# Patient Record
Sex: MALE | Race: WHITE | Employment: FULL TIME | URBAN - METROPOLITAN AREA
[De-identification: names, ages, dates, MRNs, and addresses within clinical notes are randomized per-mention and may not be internally consistent; named-entity substitution may affect disease eponyms.]

---

## 2017-06-29 DIAGNOSIS — F41.9 ANXIETY: ICD-10-CM

## 2017-06-29 RX ORDER — FLUOXETINE HYDROCHLORIDE 40 MG/1
40 CAPSULE ORAL DAILY
Qty: 30 CAPSULE | Refills: 0 | Status: SHIPPED | OUTPATIENT
Start: 2017-06-29 | End: 2017-07-17 | Stop reason: SDUPTHER

## 2017-07-17 ENCOUNTER — OFFICE VISIT (OUTPATIENT)
Dept: FAMILY MEDICINE CLINIC | Age: 37
End: 2017-07-17

## 2017-07-17 VITALS
BODY MASS INDEX: 34.71 KG/M2 | WEIGHT: 229 LBS | HEART RATE: 73 BPM | OXYGEN SATURATION: 99 % | HEIGHT: 68 IN | DIASTOLIC BLOOD PRESSURE: 64 MMHG | SYSTOLIC BLOOD PRESSURE: 124 MMHG

## 2017-07-17 DIAGNOSIS — F41.9 ANXIETY: ICD-10-CM

## 2017-07-17 PROCEDURE — 99213 OFFICE O/P EST LOW 20 MIN: CPT | Performed by: FAMILY MEDICINE

## 2017-07-17 RX ORDER — FLUOXETINE HYDROCHLORIDE 40 MG/1
40 CAPSULE ORAL DAILY
Qty: 30 CAPSULE | Refills: 11 | Status: SHIPPED | OUTPATIENT
Start: 2017-07-17 | End: 2018-07-26 | Stop reason: SDUPTHER

## 2018-07-26 DIAGNOSIS — F41.9 ANXIETY: ICD-10-CM

## 2018-07-26 RX ORDER — FLUOXETINE HYDROCHLORIDE 40 MG/1
CAPSULE ORAL
Qty: 30 CAPSULE | Refills: 10 | Status: SHIPPED | OUTPATIENT
Start: 2018-07-26 | End: 2019-08-13 | Stop reason: SDUPTHER

## 2018-08-31 DIAGNOSIS — F41.9 ANXIETY: ICD-10-CM

## 2018-08-31 RX ORDER — FLUOXETINE HYDROCHLORIDE 40 MG/1
CAPSULE ORAL
Qty: 30 CAPSULE | Refills: 10 | OUTPATIENT
Start: 2018-08-31

## 2019-01-08 ENCOUNTER — OFFICE VISIT (OUTPATIENT)
Dept: FAMILY MEDICINE CLINIC | Age: 39
End: 2019-01-08
Payer: COMMERCIAL

## 2019-01-08 VITALS
BODY MASS INDEX: 33.65 KG/M2 | WEIGHT: 222 LBS | OXYGEN SATURATION: 98 % | SYSTOLIC BLOOD PRESSURE: 124 MMHG | HEIGHT: 68 IN | DIASTOLIC BLOOD PRESSURE: 72 MMHG | HEART RATE: 69 BPM

## 2019-01-08 DIAGNOSIS — R51.9 CHRONIC NONINTRACTABLE HEADACHE, UNSPECIFIED HEADACHE TYPE: ICD-10-CM

## 2019-01-08 DIAGNOSIS — G89.29 CHRONIC NONINTRACTABLE HEADACHE, UNSPECIFIED HEADACHE TYPE: ICD-10-CM

## 2019-01-08 DIAGNOSIS — R51.9 NEW ONSET HEADACHE: Primary | ICD-10-CM

## 2019-01-08 PROCEDURE — G8484 FLU IMMUNIZE NO ADMIN: HCPCS | Performed by: FAMILY MEDICINE

## 2019-01-08 PROCEDURE — G8427 DOCREV CUR MEDS BY ELIG CLIN: HCPCS | Performed by: FAMILY MEDICINE

## 2019-01-08 PROCEDURE — G8417 CALC BMI ABV UP PARAM F/U: HCPCS | Performed by: FAMILY MEDICINE

## 2019-01-08 PROCEDURE — 99214 OFFICE O/P EST MOD 30 MIN: CPT | Performed by: FAMILY MEDICINE

## 2019-01-08 PROCEDURE — 4004F PT TOBACCO SCREEN RCVD TLK: CPT | Performed by: FAMILY MEDICINE

## 2019-01-08 RX ORDER — BUTALBITAL, ACETAMINOPHEN AND CAFFEINE 50; 325; 40 MG/1; MG/1; MG/1
1 TABLET ORAL EVERY 4 HOURS PRN
Qty: 20 TABLET | Refills: 0 | Status: SHIPPED | OUTPATIENT
Start: 2019-01-08 | End: 2020-08-10

## 2019-01-08 ASSESSMENT — PATIENT HEALTH QUESTIONNAIRE - PHQ9
1. LITTLE INTEREST OR PLEASURE IN DOING THINGS: 1
SUM OF ALL RESPONSES TO PHQ QUESTIONS 1-9: 1
SUM OF ALL RESPONSES TO PHQ QUESTIONS 1-9: 1
2. FEELING DOWN, DEPRESSED OR HOPELESS: 0
SUM OF ALL RESPONSES TO PHQ9 QUESTIONS 1 & 2: 1

## 2019-01-08 ASSESSMENT — ENCOUNTER SYMPTOMS
BLURRED VISION: 1
PHOTOPHOBIA: 0

## 2019-01-10 ENCOUNTER — TELEPHONE (OUTPATIENT)
Dept: FAMILY MEDICINE CLINIC | Age: 39
End: 2019-01-10

## 2019-01-10 RX ORDER — CYCLOBENZAPRINE HCL 5 MG
5 TABLET ORAL 2 TIMES DAILY PRN
Qty: 20 TABLET | Refills: 0 | Status: SHIPPED | OUTPATIENT
Start: 2019-01-10 | End: 2019-01-20

## 2019-01-16 ENCOUNTER — TELEPHONE (OUTPATIENT)
Dept: FAMILY MEDICINE CLINIC | Age: 39
End: 2019-01-16

## 2019-01-17 ENCOUNTER — HOSPITAL ENCOUNTER (OUTPATIENT)
Dept: MRI IMAGING | Age: 39
Discharge: HOME | End: 2019-01-17
Payer: COMMERCIAL

## 2019-01-17 DIAGNOSIS — R51.9 NEW ONSET HEADACHE: ICD-10-CM

## 2019-01-17 PROCEDURE — 6360000004 HC RX CONTRAST MEDICATION: Performed by: FAMILY MEDICINE

## 2019-01-17 PROCEDURE — A9579 GAD-BASE MR CONTRAST NOS,1ML: HCPCS | Performed by: FAMILY MEDICINE

## 2019-01-17 PROCEDURE — 70553 MRI BRAIN STEM W/O & W/DYE: CPT

## 2019-01-17 RX ADMIN — GADOTERIDOL 20 ML: 279.3 INJECTION, SOLUTION INTRAVENOUS at 08:13

## 2019-01-23 ENCOUNTER — TELEPHONE (OUTPATIENT)
Dept: FAMILY MEDICINE CLINIC | Age: 39
End: 2019-01-23

## 2019-01-23 ENCOUNTER — OFFICE VISIT (OUTPATIENT)
Dept: FAMILY MEDICINE CLINIC | Age: 39
End: 2019-01-23
Payer: COMMERCIAL

## 2019-01-23 VITALS
OXYGEN SATURATION: 98 % | BODY MASS INDEX: 33.3 KG/M2 | HEART RATE: 81 BPM | SYSTOLIC BLOOD PRESSURE: 138 MMHG | DIASTOLIC BLOOD PRESSURE: 78 MMHG | WEIGHT: 219 LBS

## 2019-01-23 DIAGNOSIS — G44.209 TENSION HEADACHE: Primary | ICD-10-CM

## 2019-01-23 PROCEDURE — 4004F PT TOBACCO SCREEN RCVD TLK: CPT | Performed by: FAMILY MEDICINE

## 2019-01-23 PROCEDURE — G8427 DOCREV CUR MEDS BY ELIG CLIN: HCPCS | Performed by: FAMILY MEDICINE

## 2019-01-23 PROCEDURE — G8484 FLU IMMUNIZE NO ADMIN: HCPCS | Performed by: FAMILY MEDICINE

## 2019-01-23 PROCEDURE — 99213 OFFICE O/P EST LOW 20 MIN: CPT | Performed by: FAMILY MEDICINE

## 2019-01-23 PROCEDURE — G8417 CALC BMI ABV UP PARAM F/U: HCPCS | Performed by: FAMILY MEDICINE

## 2019-01-23 RX ORDER — AMITRIPTYLINE HYDROCHLORIDE 10 MG/1
10 TABLET, FILM COATED ORAL NIGHTLY
Qty: 30 TABLET | Refills: 0 | Status: SHIPPED | OUTPATIENT
Start: 2019-01-23 | End: 2020-08-10

## 2019-01-23 RX ORDER — DICLOFENAC POTASSIUM 50 MG/1
50 TABLET, FILM COATED ORAL 3 TIMES DAILY
Qty: 30 TABLET | Refills: 0 | Status: SHIPPED | OUTPATIENT
Start: 2019-01-23 | End: 2021-02-04 | Stop reason: ALTCHOICE

## 2019-01-23 ASSESSMENT — ENCOUNTER SYMPTOMS
ABDOMINAL PAIN: 0
PHOTOPHOBIA: 0

## 2019-08-13 DIAGNOSIS — F41.9 ANXIETY: ICD-10-CM

## 2019-08-13 RX ORDER — FLUOXETINE HYDROCHLORIDE 40 MG/1
CAPSULE ORAL
Qty: 30 CAPSULE | Refills: 10 | Status: SHIPPED | OUTPATIENT
Start: 2019-08-13 | End: 2019-11-26 | Stop reason: SDUPTHER

## 2019-11-26 DIAGNOSIS — F41.9 ANXIETY: ICD-10-CM

## 2019-11-26 RX ORDER — FLUOXETINE HYDROCHLORIDE 40 MG/1
CAPSULE ORAL
Qty: 30 CAPSULE | Refills: 10 | Status: SHIPPED | OUTPATIENT
Start: 2019-11-26 | End: 2020-08-10

## 2020-06-16 ENCOUNTER — TELEPHONE (OUTPATIENT)
Dept: FAMILY MEDICINE CLINIC | Age: 40
End: 2020-06-16

## 2020-06-18 ENCOUNTER — OFFICE VISIT (OUTPATIENT)
Dept: FAMILY MEDICINE CLINIC | Age: 40
End: 2020-06-18
Payer: COMMERCIAL

## 2020-06-18 VITALS
DIASTOLIC BLOOD PRESSURE: 88 MMHG | TEMPERATURE: 97.8 F | WEIGHT: 233 LBS | HEART RATE: 72 BPM | SYSTOLIC BLOOD PRESSURE: 134 MMHG | HEIGHT: 68 IN | OXYGEN SATURATION: 99 % | BODY MASS INDEX: 35.31 KG/M2

## 2020-06-18 PROCEDURE — 10060 I&D ABSCESS SIMPLE/SINGLE: CPT | Performed by: FAMILY MEDICINE

## 2020-06-18 PROCEDURE — 99213 OFFICE O/P EST LOW 20 MIN: CPT | Performed by: FAMILY MEDICINE

## 2020-06-18 RX ORDER — DOXYCYCLINE HYCLATE 100 MG
100 TABLET ORAL 2 TIMES DAILY
Qty: 14 TABLET | Refills: 0 | Status: SHIPPED | OUTPATIENT
Start: 2020-06-18 | End: 2020-06-25

## 2020-06-18 ASSESSMENT — PATIENT HEALTH QUESTIONNAIRE - PHQ9
2. FEELING DOWN, DEPRESSED OR HOPELESS: 0
SUM OF ALL RESPONSES TO PHQ QUESTIONS 1-9: 0
SUM OF ALL RESPONSES TO PHQ9 QUESTIONS 1 & 2: 0
SUM OF ALL RESPONSES TO PHQ QUESTIONS 1-9: 0
1. LITTLE INTEREST OR PLEASURE IN DOING THINGS: 0

## 2020-06-20 LAB
GRAM STAIN RESULT: ABNORMAL
WOUND/ABSCESS: ABNORMAL

## 2020-06-27 NOTE — PROGRESS NOTES
Subjective:       Bassam Daugherty is a 45 y.o. male who presents for evaluation of a probable cutaneous abscess. Lesion is located in the midline gluteal fold Onset was a few weeks ago. Symptoms have gradually worsened. Abscess has associated symptoms of spontaneous drainage, pain. Patient does not have previous history of cutaneous abscesses. Patient does not have diabetes. PMH: NONE  Patient's medications, allergies, past medical, surgical, social and family histories were reviewed and updated as appropriate. Objective: There is an area characterized by cystic mass with a central pore measuring 2 cm in greatest dimension. Location: gluteal fold. Procedure  Informed consent obtained. The area was prepped in the usual manner and the skin overlying the abscess was anesthetized with 3cc of 1% lidocaine with epinephrine. The area was sharply incised and approx 2ccs of purulent material was obtained. Packing was inserted. Assessment:       Pilonidal cyst      Plan:      I & D procedure as above. 1. Pilonidal cyst    - doxycycline hyclate (VIBRA-TABS) 100 MG tablet; Take 1 tablet by mouth 2 times daily for 7 days  Dispense: 14 tablet;  Refill: Sean Sims MD, General Surgery, [facility]  - Culture, Wound  - [address]

## 2020-07-13 ENCOUNTER — OFFICE VISIT (OUTPATIENT)
Dept: SURGERY | Age: 40
End: 2020-07-13
Payer: COMMERCIAL

## 2020-07-13 VITALS
BODY MASS INDEX: 35.31 KG/M2 | HEIGHT: 68 IN | DIASTOLIC BLOOD PRESSURE: 80 MMHG | WEIGHT: 233 LBS | SYSTOLIC BLOOD PRESSURE: 130 MMHG | TEMPERATURE: 97.4 F

## 2020-07-13 PROBLEM — L98.9 SKIN LESION: Status: ACTIVE | Noted: 2020-07-13

## 2020-07-13 PROCEDURE — 99202 OFFICE O/P NEW SF 15 MIN: CPT | Performed by: SURGERY

## 2020-07-13 ASSESSMENT — ENCOUNTER SYMPTOMS
EYES NEGATIVE: 1
ALLERGIC/IMMUNOLOGIC NEGATIVE: 1
GASTROINTESTINAL NEGATIVE: 1
EYE ITCHING: 0
ABDOMINAL DISTENTION: 0
APNEA: 1
EYE DISCHARGE: 0
CHEST TIGHTNESS: 0
APNEA: 0
ABDOMINAL PAIN: 0

## 2020-07-13 NOTE — LETTER
[place] [address]  [address] [address]  Phone: [phone]  Fax: [phone]    July 13, 2020    Patient: Bernarda Dugan  MRN:  [id number]  YOB: 1980  Date of Visit: 7/13/2020    Dear Dr Columba Carcamo: Thank you for the request for consultation for [facility]. Below are the relevant portions of my assessment and plan of care. Assessment:  71-year-old male who presents for evaluation of a one-year history of pilonidal disease. He recently underwent incision and drainage. Culture showed gram-positive cocci and gram-negative rods. On current physical examination, he has 3 noninflamed appearing pilonidal sinuses. Toward the upper portion of the sacrum just to the right of midline there is a 2 x 1 cm raised skin lesion. Although some of the findings are consistent with pilonidal disease, the raised lesion is atypical appearing and was biopsied. Plan:       Plan: We will plan to communicate results of the biopsy with the patient by phone. If this represents a skin lesion, we will plan for excision of the skin lesion only in the office under local anesthetic. If the biopsy shows inflammatory changes only and is consistent with pilonidal disease, we will plan for formal excision of pilonidal disease in the operating room. If you have questions, please do not hesitate to call me. I look forward to following Luis Enrique along with you. Sincerely,    Phylicia Higgins MD     providers:    Ki Jolley MD  [address] [place] [address].   [address] [place] [address]

## 2020-07-13 NOTE — PROGRESS NOTES
[facility]  SUBJECTIVE:    Chief Complaint: Nikole Fermin   1980   45 y.o. male ***    Patient denies fevers, chills, nausea, vomiting, jaundice, dysuria, change in appetite, weight, or bowel movements. General complaint, OLDPQRST  Relevant medical history and medications  Relevant surgical history  Chronic pain history  Smoking history  Alcohol history    Review of Systems   Constitutional: Negative for activity change and appetite change. HENT: Negative for congestion and dental problem. Eyes: Negative for discharge and itching. Respiratory: Negative for apnea and chest tightness. Cardiovascular: Negative for chest pain and leg swelling. Gastrointestinal: Negative for abdominal distention and abdominal pain. Endocrine: Negative for cold intolerance and heat intolerance. Genitourinary: Negative for difficulty urinating and dysuria. Musculoskeletal: Negative. Negative for arthralgias. Skin: Positive for wound. Allergic/Immunologic: Negative. Neurological: Negative. Hematological: Negative. Psychiatric/Behavioral: Negative.         Past Medical History:   Diagnosis Date    GERD (gastroesophageal reflux disease)      Past Surgical History:   Procedure Laterality Date    COLONOSCOPY  2010    COLONOSCOPY  2/3/16    diverticulosis    EYE SURGERY      orbital floor     Social History     Socioeconomic History    Marital status:      Spouse name: Not on file    Number of children: Not on file    Years of education: Not on file    Highest education level: Not on file   Occupational History    Occupation: engineering, mechanical   Social Needs    Financial resource strain: Not on file    Food insecurity     Worry: Not on file     Inability: Not on file    Transportation needs     Medical: Not on file     Non-medical: Not on file   Tobacco Use    Smoking status: Former Smoker     Packs/day: 0.25     Years: 4.00 this visit. Allergies   Allergen Reactions    Penicillins Rash        OBJECTIVE:  /80   Temp 97.4 °F (36.3 °C) (Skin)   Ht 5' 8\" (1.727 m)   Wt 233 lb (105.7 kg)   BMI 35.43 kg/m²    Physical Exam     Labs:  Office Visit on 06/18/2020   Component Date Value Ref Range Status    WOUND/ABSCESS 06/18/2020    Final                    Value:Mixed skin wade,  Moderate growth  No further workup      Gram Stain Result 06/18/2020 *  Final                    Value:1+ Epithelial Cells  No WBC's seen  2+ Gram positive cocci  1+ Gram variable rods         Imaging:  No results found. ***    ASSESSMENT:  ***    PLAN:  ***  ***. The patient {IS:19891} currently smoking. {Blanksincarmelo:62606::\"Recommend maintaining a smoke-free lifestyle. \",\"Counseled on the numerous risks associated with smoking. We discussed a number of smoking cessation options and patient will be provided with information related to these options\"}    Tobi Roger MD, FACS  7/13/2020  1:46 PM

## 2020-07-13 NOTE — PROGRESS NOTES
:     Alfredo Red is a 45 y.o. male     CC: Pilonidal cyst    HPI: 60-year-old male who presents for evaluation of a one-year history of a draining wound over the sacral region. The wound waxes and wanes in severity. It mostly drains serosanguineous fluid. It worsened recently and he was seen by his primary care physician who performed incision and drainage. Culture showed gram-positive cocci and gram-negative rods. Past Medical History:   Diagnosis Date    GERD (gastroesophageal reflux disease)        Penicillins     Past Surgical History:   Procedure Laterality Date    COLONOSCOPY  2010    COLONOSCOPY  2/3/16    diverticulosis    EYE SURGERY      orbital floor        Prior to Visit Medications    Medication Sig Taking?  Authorizing Provider   FLUoxetine (PROZAC) 40 MG capsule TAKE ONE CAPSULE BY MOUTH DAILY Yes Leda May MD   diclofenac (CATAFLAM) 50 MG tablet Take 1 tablet by mouth 3 times daily Yes Leda May MD   amitriptyline (ELAVIL) 10 MG tablet Take 1 tablet by mouth nightly Yes Leda May MD   diclofenac (VOLTAREN) 50 MG EC tablet Take 1 tablet by mouth 3 times daily (with meals) Yes Leda May MD   butalbital-acetaminophen-caffeine (FIORICET, ESGIC) -40 MG per tablet Take 1 tablet by mouth every 4 hours as needed for Headaches Yes Leda May MD   omeprazole (PRILOSEC) 10 MG capsule Take 10 mg by mouth daily Yes Historical Provider, MD       Social History     Socioeconomic History    Marital status:      Spouse name: Not on file    Number of children: Not on file    Years of education: Not on file    Highest education level: Not on file   Occupational History    Occupation: engineering, mechanical   Social Needs    Financial resource strain: Not on file    Food insecurity     Worry: Not on file     Inability: Not on file    Transportation needs     Medical: Not on file     Non-medical: Not on file   Tobacco Use    Smoking status: Former Smoker Packs/day: 0.25     Years: 4.00     Pack years: 1.00     Types: Cigarettes     Last attempt to quit: 2018     Years since quittin.5    Smokeless tobacco: Former User     Types: Chew    Tobacco comment: socially   Substance and Sexual Activity    Alcohol use: Yes     Alcohol/week: 0.0 standard drinks     Comment: socially    Drug use: No    Sexual activity: Yes     Partners: Female   Lifestyle    Physical activity     Days per week: Not on file     Minutes per session: Not on file    Stress: Not on file   Relationships    Social connections     Talks on phone: Not on file     Gets together: Not on file     Attends [employer] service: Not on file     Active member of club or organization: Not on file     Attends meetings of clubs or organizations: Not on file     Relationship status: Not on file    Intimate partner violence     Fear of current or ex partner: Not on file     Emotionally abused: Not on file     Physically abused: Not on file     Forced sexual activity: Not on file   Other Topics Concern    Not on file   Social History Narrative    Not on file       Review of Systems   Constitutional: Negative. HENT: Negative. Eyes: Negative. Respiratory: Positive for apnea. Cardiovascular: Negative. Gastrointestinal: Negative. Endocrine: Negative. Genitourinary: Negative. Musculoskeletal: Negative. Skin: Negative. Allergic/Immunologic: Negative. Neurological: Negative. Hematological: Negative. Psychiatric/Behavioral: Negative.        :   Physical Exam  Constitutional:       Appearance: He is well-developed. HENT:      Head: Normocephalic and atraumatic. Right Ear: External ear normal.      Left Ear: External ear normal.   Eyes:      Conjunctiva/sclera: Conjunctivae normal.   Neck:      Musculoskeletal: Normal range of motion and neck supple. Cardiovascular:      Rate and Rhythm: Normal rate and regular rhythm.    Pulmonary:      Effort: Pulmonary effort is normal.      Breath sounds: Normal breath sounds. Abdominal:      Palpations: Abdomen is soft. Comments: There are 3 pilonidal sinuses in the midline overlying the sacrum  There is a 2 cm x 1 cm raised lesion to the right of midline of the upper sacral region   Musculoskeletal: Normal range of motion. Skin:     General: Skin is warm and dry. Neurological:      Mental Status: He is alert and oriented to person, place, and time. Psychiatric:         Behavior: Behavior normal.       /80   Temp 97.4 °F (36.3 °C) (Skin)   Ht 5' 8\" (1.727 m)   Wt 233 lb (105.7 kg)   BMI 35.43 kg/m²     :      78-year-old male who presents for evaluation of a one-year history of pilonidal disease. He recently underwent incision and drainage. Culture showed gram-positive cocci and gram-negative rods. On current physical examination, he has 3 noninflamed appearing pilonidal sinuses. Toward the upper portion of the sacrum just to the right of midline there is a 2 x 1 cm raised skin lesion. Although some of the findings are consistent with pilonidal disease, the raised lesion is atypical appearing and was biopsied. Plan: We will plan to communicate results of the biopsy with the patient by phone. If this represents a skin lesion, we will plan for excision of the skin lesion only in the office under local anesthetic. If the biopsy shows inflammatory changes only and is consistent with pilonidal disease, we will plan for formal excision of pilonidal disease in the operating room.

## 2020-08-07 ENCOUNTER — OFFICE VISIT (OUTPATIENT)
Dept: PRIMARY CARE CLINIC | Age: 40
End: 2020-08-07
Payer: COMMERCIAL

## 2020-08-07 PROCEDURE — 99211 OFF/OP EST MAY X REQ PHY/QHP: CPT | Performed by: NURSE PRACTITIONER

## 2020-08-07 NOTE — PATIENT INSTRUCTIONS

## 2020-08-09 LAB — SARS-COV-2, NAA: NOT DETECTED

## 2020-08-10 NOTE — PROGRESS NOTES
and take you home after your surgery. You will not be allowed to leave alone or drive yourself home. It is strongly suggested someone stay with you the first 24 hrs. Your surgery will be cancelled if you do not have a ride home. 8. A parent/legal guardian must accompany a child scheduled for surgery and plan to stay at the hospital until the child is discharged. Please do not bring other children with you. 9. Please wear simple, loose fitting clothing to the hospital.  Kimberley Porch not bring valuables (money, credit cards, checkbooks, etc.) Do not wear any makeup (including no eye makeup) or nail polish on your fingers or toes. 10. DO NOT wear any jewelry or piercings on day of surgery. All body piercing jewelry must be removed. 11. If you have ___dentures, they will be removed before going to the OR; we will provide you a container. If you wear ___contact lenses or ___glasses, they will be removed; please bring a case for them. 12. Please see your family doctor/pediatrician for a history & physical and/or concerning medications. Bring any test results/reports from your physician's office. PCP____fitz______________Phone___________H&P Appt. Date________             13 If you  have a Living Will and Durable Power of  for Healthcare, please bring in a copy. 15. Notify your Surgeon if you develop any illness between now and surgery  time, cough, cold, fever, sore throat, nausea, vomiting, etc.  Please notify your surgeon if you experience dizziness, shortness of breath or blurred vision between now & the time of your surgery             15. DO NOT shave your operative site 96 hours prior to surgery. For face & neck surgery, men may use an electric razor 48 hours prior to surgery. 16. Shower the night before or morning of surgery using an antibacterial soap or as you have been instructed.              17. To provide excellent care visitors will be limited to one in the room at any given time. 18.  Please bring picture ID and insurance card. 19.  Visit our web site for additional information:  [email or web address]              20.During flu season no children under the age of 15 are permitted in the hospital for the safety of all patients. 21. If you take a long acting insulin in the evening only  take half of your usual  dose the night  before your procedure              22. If you use a c-pap please bring DOS if staying overnight,             23.For your convenience [address] [facility] has a pharmacy on site to fill your prescriptions. 24. If you use oxygen and have a portable tank please bring it  with you the DOS             25. Bring a complete list of all your medications with name and dose include any supplements. 32. Other_____There is a one visitor policy at [facility] for the pre-op phase only for surgery and endoscopy cases. The visitor is expected to leave the facility after the patient is taken back for the procedure. Pain management is NO VISITOR policyThe patients ride is expected to remain in the car with a cell phone for communication. If the ride is leaving the hospital grounds please make sure they are back in time for pickup. Have the patient inform the staff on arrival what their rides plans are while the patient is in the facility. At the MAIN there is one visitor allowed. Please note that the visitor policy is subject to change. Patient instructed to get their COVID-19 test done as directed by their doctor (5-7 days prior to procedure)  or patient states will get on __8/7/20________. Patient was notified that they need to have an appointment,number to call provided. The day the COVID test is done is considered day one. Instructed to self quarantine after test until DOS. _____________________________________   *Please call pre admission testing if you any further questions   Elli Marilynn [address] [place] [address]. [place]  [phone]   [address] [place] [address]       All above information reviewed with patient in person or by phone. Patient verbalizes understanding. All questions and concerns addressed.                                                                                                  Patient/Rep_____pt_______________                                                                                                                                    PRE OP INSTRUCTIONS

## 2020-08-12 ENCOUNTER — ANESTHESIA EVENT (OUTPATIENT)
Dept: OPERATING ROOM | Age: 40
End: 2020-08-12
Payer: COMMERCIAL

## 2020-08-12 ENCOUNTER — HOSPITAL ENCOUNTER (OUTPATIENT)
Age: 40
Setting detail: OUTPATIENT SURGERY
Discharge: HOME HEALTH | End: 2020-08-12
Attending: SURGERY | Admitting: SURGERY
Payer: COMMERCIAL

## 2020-08-12 ENCOUNTER — ANESTHESIA (OUTPATIENT)
Dept: OPERATING ROOM | Age: 40
End: 2020-08-12
Payer: COMMERCIAL

## 2020-08-12 VITALS
OXYGEN SATURATION: 96 % | HEART RATE: 78 BPM | DIASTOLIC BLOOD PRESSURE: 69 MMHG | BODY MASS INDEX: 33.65 KG/M2 | HEIGHT: 68 IN | RESPIRATION RATE: 12 BRPM | WEIGHT: 222 LBS | SYSTOLIC BLOOD PRESSURE: 132 MMHG | TEMPERATURE: 96.9 F

## 2020-08-12 VITALS
DIASTOLIC BLOOD PRESSURE: 57 MMHG | OXYGEN SATURATION: 100 % | RESPIRATION RATE: 12 BRPM | TEMPERATURE: 96.6 F | SYSTOLIC BLOOD PRESSURE: 111 MMHG

## 2020-08-12 PROCEDURE — 2709999900 HC NON-CHARGEABLE SUPPLY: Performed by: SURGERY

## 2020-08-12 PROCEDURE — 6370000000 HC RX 637 (ALT 250 FOR IP): Performed by: ANESTHESIOLOGY

## 2020-08-12 PROCEDURE — 7100000000 HC PACU RECOVERY - FIRST 15 MIN: Performed by: SURGERY

## 2020-08-12 PROCEDURE — 6360000002 HC RX W HCPCS: Performed by: NURSE ANESTHETIST, CERTIFIED REGISTERED

## 2020-08-12 PROCEDURE — 2580000003 HC RX 258: Performed by: ANESTHESIOLOGY

## 2020-08-12 PROCEDURE — 3700000001 HC ADD 15 MINUTES (ANESTHESIA): Performed by: SURGERY

## 2020-08-12 PROCEDURE — 6360000002 HC RX W HCPCS: Performed by: SURGERY

## 2020-08-12 PROCEDURE — 6370000000 HC RX 637 (ALT 250 FOR IP)

## 2020-08-12 PROCEDURE — 7100000001 HC PACU RECOVERY - ADDTL 15 MIN: Performed by: SURGERY

## 2020-08-12 PROCEDURE — 3700000000 HC ANESTHESIA ATTENDED CARE: Performed by: SURGERY

## 2020-08-12 PROCEDURE — 3600000003 HC SURGERY LEVEL 3 BASE: Performed by: SURGERY

## 2020-08-12 PROCEDURE — 6360000002 HC RX W HCPCS: Performed by: ANESTHESIOLOGY

## 2020-08-12 PROCEDURE — 3600000013 HC SURGERY LEVEL 3 ADDTL 15MIN: Performed by: SURGERY

## 2020-08-12 PROCEDURE — 7100000010 HC PHASE II RECOVERY - FIRST 15 MIN: Performed by: SURGERY

## 2020-08-12 PROCEDURE — 2500000003 HC RX 250 WO HCPCS: Performed by: NURSE ANESTHETIST, CERTIFIED REGISTERED

## 2020-08-12 PROCEDURE — 2580000003 HC RX 258: Performed by: SURGERY

## 2020-08-12 PROCEDURE — 7100000011 HC PHASE II RECOVERY - ADDTL 15 MIN: Performed by: SURGERY

## 2020-08-12 PROCEDURE — 88304 TISSUE EXAM BY PATHOLOGIST: CPT

## 2020-08-12 RX ORDER — LIDOCAINE HYDROCHLORIDE 10 MG/ML
1 INJECTION, SOLUTION EPIDURAL; INFILTRATION; INTRACAUDAL; PERINEURAL
Status: DISCONTINUED | OUTPATIENT
Start: 2020-08-12 | End: 2020-08-12 | Stop reason: HOSPADM

## 2020-08-12 RX ORDER — FENTANYL CITRATE 50 UG/ML
INJECTION, SOLUTION INTRAMUSCULAR; INTRAVENOUS PRN
Status: DISCONTINUED | OUTPATIENT
Start: 2020-08-12 | End: 2020-08-12 | Stop reason: SDUPTHER

## 2020-08-12 RX ORDER — SODIUM CHLORIDE 0.9 % (FLUSH) 0.9 %
10 SYRINGE (ML) INJECTION PRN
Status: DISCONTINUED | OUTPATIENT
Start: 2020-08-12 | End: 2020-08-12 | Stop reason: HOSPADM

## 2020-08-12 RX ORDER — PROPOFOL 10 MG/ML
INJECTION, EMULSION INTRAVENOUS PRN
Status: DISCONTINUED | OUTPATIENT
Start: 2020-08-12 | End: 2020-08-12 | Stop reason: SDUPTHER

## 2020-08-12 RX ORDER — SODIUM CHLORIDE 0.9 % (FLUSH) 0.9 %
10 SYRINGE (ML) INJECTION EVERY 12 HOURS SCHEDULED
Status: DISCONTINUED | OUTPATIENT
Start: 2020-08-12 | End: 2020-08-12 | Stop reason: HOSPADM

## 2020-08-12 RX ORDER — DEXAMETHASONE SODIUM PHOSPHATE 4 MG/ML
INJECTION, SOLUTION INTRA-ARTICULAR; INTRALESIONAL; INTRAMUSCULAR; INTRAVENOUS; SOFT TISSUE PRN
Status: DISCONTINUED | OUTPATIENT
Start: 2020-08-12 | End: 2020-08-12 | Stop reason: SDUPTHER

## 2020-08-12 RX ORDER — FENTANYL CITRATE 50 UG/ML
25 INJECTION, SOLUTION INTRAMUSCULAR; INTRAVENOUS EVERY 5 MIN PRN
Status: DISCONTINUED | OUTPATIENT
Start: 2020-08-12 | End: 2020-08-12 | Stop reason: HOSPADM

## 2020-08-12 RX ORDER — ONDANSETRON 2 MG/ML
INJECTION INTRAMUSCULAR; INTRAVENOUS PRN
Status: DISCONTINUED | OUTPATIENT
Start: 2020-08-12 | End: 2020-08-12 | Stop reason: SDUPTHER

## 2020-08-12 RX ORDER — ACETAMINOPHEN 650 MG
TABLET, EXTENDED RELEASE ORAL
Status: COMPLETED | OUTPATIENT
Start: 2020-08-12 | End: 2020-08-12

## 2020-08-12 RX ORDER — ONDANSETRON 2 MG/ML
4 INJECTION INTRAMUSCULAR; INTRAVENOUS
Status: DISCONTINUED | OUTPATIENT
Start: 2020-08-12 | End: 2020-08-12 | Stop reason: HOSPADM

## 2020-08-12 RX ORDER — HYDROMORPHONE HCL 110MG/55ML
PATIENT CONTROLLED ANALGESIA SYRINGE INTRAVENOUS PRN
Status: DISCONTINUED | OUTPATIENT
Start: 2020-08-12 | End: 2020-08-12 | Stop reason: SDUPTHER

## 2020-08-12 RX ORDER — HYDROCODONE BITARTRATE AND ACETAMINOPHEN 5; 325 MG/1; MG/1
1 TABLET ORAL
Status: COMPLETED | OUTPATIENT
Start: 2020-08-12 | End: 2020-08-12

## 2020-08-12 RX ORDER — LIDOCAINE HYDROCHLORIDE 20 MG/ML
INJECTION, SOLUTION EPIDURAL; INFILTRATION; INTRACAUDAL; PERINEURAL PRN
Status: DISCONTINUED | OUTPATIENT
Start: 2020-08-12 | End: 2020-08-12 | Stop reason: SDUPTHER

## 2020-08-12 RX ORDER — MAGNESIUM HYDROXIDE 1200 MG/15ML
LIQUID ORAL CONTINUOUS PRN
Status: COMPLETED | OUTPATIENT
Start: 2020-08-12 | End: 2020-08-12

## 2020-08-12 RX ORDER — SUCCINYLCHOLINE/SOD CL,ISO/PF 200MG/10ML
SYRINGE (ML) INTRAVENOUS PRN
Status: DISCONTINUED | OUTPATIENT
Start: 2020-08-12 | End: 2020-08-12 | Stop reason: SDUPTHER

## 2020-08-12 RX ORDER — HYDRALAZINE HYDROCHLORIDE 20 MG/ML
5 INJECTION INTRAMUSCULAR; INTRAVENOUS EVERY 10 MIN PRN
Status: DISCONTINUED | OUTPATIENT
Start: 2020-08-12 | End: 2020-08-12 | Stop reason: HOSPADM

## 2020-08-12 RX ORDER — LABETALOL HYDROCHLORIDE 5 MG/ML
5 INJECTION, SOLUTION INTRAVENOUS EVERY 10 MIN PRN
Status: DISCONTINUED | OUTPATIENT
Start: 2020-08-12 | End: 2020-08-12 | Stop reason: HOSPADM

## 2020-08-12 RX ORDER — SODIUM CHLORIDE, SODIUM LACTATE, POTASSIUM CHLORIDE, CALCIUM CHLORIDE 600; 310; 30; 20 MG/100ML; MG/100ML; MG/100ML; MG/100ML
INJECTION, SOLUTION INTRAVENOUS CONTINUOUS
Status: DISCONTINUED | OUTPATIENT
Start: 2020-08-12 | End: 2020-08-12 | Stop reason: HOSPADM

## 2020-08-12 RX ORDER — PROCHLORPERAZINE EDISYLATE 5 MG/ML
5 INJECTION INTRAMUSCULAR; INTRAVENOUS
Status: DISCONTINUED | OUTPATIENT
Start: 2020-08-12 | End: 2020-08-12 | Stop reason: HOSPADM

## 2020-08-12 RX ORDER — HYDROMORPHONE HCL 110MG/55ML
0.5 PATIENT CONTROLLED ANALGESIA SYRINGE INTRAVENOUS EVERY 5 MIN PRN
Status: DISCONTINUED | OUTPATIENT
Start: 2020-08-12 | End: 2020-08-12 | Stop reason: HOSPADM

## 2020-08-12 RX ORDER — HYDROCODONE BITARTRATE AND ACETAMINOPHEN 5; 325 MG/1; MG/1
1-2 TABLET ORAL EVERY 4 HOURS PRN
Qty: 15 TABLET | Refills: 0 | Status: SHIPPED | OUTPATIENT
Start: 2020-08-12 | End: 2020-08-15

## 2020-08-12 RX ORDER — MIDAZOLAM HYDROCHLORIDE 1 MG/ML
INJECTION INTRAMUSCULAR; INTRAVENOUS PRN
Status: DISCONTINUED | OUTPATIENT
Start: 2020-08-12 | End: 2020-08-12 | Stop reason: SDUPTHER

## 2020-08-12 RX ADMIN — MIDAZOLAM 2 MG: 1 INJECTION INTRAMUSCULAR; INTRAVENOUS at 12:24

## 2020-08-12 RX ADMIN — DEXAMETHASONE SODIUM PHOSPHATE 10 MG: 4 INJECTION, SOLUTION INTRAMUSCULAR; INTRAVENOUS at 12:42

## 2020-08-12 RX ADMIN — Medication 140 MG: at 12:33

## 2020-08-12 RX ADMIN — ONDANSETRON 4 MG: 2 INJECTION INTRAMUSCULAR; INTRAVENOUS at 12:42

## 2020-08-12 RX ADMIN — PROPOFOL 200 MG: 10 INJECTION, EMULSION INTRAVENOUS at 12:33

## 2020-08-12 RX ADMIN — LIDOCAINE HYDROCHLORIDE 100 MG: 20 INJECTION, SOLUTION EPIDURAL; INFILTRATION; INTRACAUDAL; PERINEURAL at 12:30

## 2020-08-12 RX ADMIN — SODIUM CHLORIDE, POTASSIUM CHLORIDE, SODIUM LACTATE AND CALCIUM CHLORIDE: 600; 310; 30; 20 INJECTION, SOLUTION INTRAVENOUS at 12:20

## 2020-08-12 RX ADMIN — HYDROMORPHONE HYDROCHLORIDE 0.5 MG: 2 INJECTION, SOLUTION INTRAMUSCULAR; INTRAVENOUS; SUBCUTANEOUS at 13:39

## 2020-08-12 RX ADMIN — HYDROMORPHONE HYDROCHLORIDE 0.5 MG: 2 INJECTION, SOLUTION INTRAMUSCULAR; INTRAVENOUS; SUBCUTANEOUS at 13:51

## 2020-08-12 RX ADMIN — FENTANYL CITRATE 50 MCG: 50 INJECTION, SOLUTION INTRAMUSCULAR; INTRAVENOUS at 12:29

## 2020-08-12 RX ADMIN — HYDROCODONE BITARTRATE AND ACETAMINOPHEN 1 TABLET: 5; 325 TABLET ORAL at 14:10

## 2020-08-12 RX ADMIN — SODIUM CHLORIDE, POTASSIUM CHLORIDE, SODIUM LACTATE AND CALCIUM CHLORIDE: 600; 310; 30; 20 INJECTION, SOLUTION INTRAVENOUS at 13:12

## 2020-08-12 RX ADMIN — CEFAZOLIN 2 G: 10 INJECTION, POWDER, FOR SOLUTION INTRAVENOUS at 12:20

## 2020-08-12 RX ADMIN — HYDROMORPHONE HYDROCHLORIDE 0.5 MG: 2 INJECTION, SOLUTION INTRAMUSCULAR; INTRAVENOUS; SUBCUTANEOUS at 13:27

## 2020-08-12 RX ADMIN — FENTANYL CITRATE 50 MCG: 50 INJECTION, SOLUTION INTRAMUSCULAR; INTRAVENOUS at 12:54

## 2020-08-12 ASSESSMENT — PULMONARY FUNCTION TESTS
PIF_VALUE: 23
PIF_VALUE: 22
PIF_VALUE: 25
PIF_VALUE: 23
PIF_VALUE: 3
PIF_VALUE: 22
PIF_VALUE: 22
PIF_VALUE: 23
PIF_VALUE: 1
PIF_VALUE: 21
PIF_VALUE: 22
PIF_VALUE: 0
PIF_VALUE: 22
PIF_VALUE: 1
PIF_VALUE: 1
PIF_VALUE: 26
PIF_VALUE: 22
PIF_VALUE: 15
PIF_VALUE: 22
PIF_VALUE: 22
PIF_VALUE: 16
PIF_VALUE: 24
PIF_VALUE: 22
PIF_VALUE: 23
PIF_VALUE: 23
PIF_VALUE: 22
PIF_VALUE: 23
PIF_VALUE: 23
PIF_VALUE: 24
PIF_VALUE: 23
PIF_VALUE: 22
PIF_VALUE: 24
PIF_VALUE: 22
PIF_VALUE: 22
PIF_VALUE: 0
PIF_VALUE: 13
PIF_VALUE: 21
PIF_VALUE: 22
PIF_VALUE: 23
PIF_VALUE: 23
PIF_VALUE: 24
PIF_VALUE: 20
PIF_VALUE: 21
PIF_VALUE: 26
PIF_VALUE: 16
PIF_VALUE: 24
PIF_VALUE: 1
PIF_VALUE: 25
PIF_VALUE: 21
PIF_VALUE: 20
PIF_VALUE: 2
PIF_VALUE: 23
PIF_VALUE: 18
PIF_VALUE: 24
PIF_VALUE: 22

## 2020-08-12 ASSESSMENT — ENCOUNTER SYMPTOMS: SHORTNESS OF BREATH: 0

## 2020-08-12 ASSESSMENT — PAIN SCALES - GENERAL
PAINLEVEL_OUTOF10: 7
PAINLEVEL_OUTOF10: 6
PAINLEVEL_OUTOF10: 7
PAINLEVEL_OUTOF10: 7

## 2020-08-12 NOTE — OP NOTE
[facility]                     [address] [facility], [address] [place] [address]                                OPERATIVE REPORT    PATIENT NAME: Chuck Sanchez                 :        1980  MED REC NO:   [id number]                          ROOM:  ACCOUNT NO:   [de-identified]                           ADMIT DATE: 2020  PROVIDER:     Kojo Luna MD    DATE OF PROCEDURE:  2020    PREOPERATIVE DIAGNOSIS:  Pilonidal cyst.    POSTOPERATIVE DIAGNOSIS:  Pilonidal cyst.    OPERATION PERFORMED:  Excision of pilonidal cyst.    SURGEON:  Dorian Riley MD    ANESTHESIA:  General endotracheal.    ESTIMATED BLOOD LOSS:  Minimal.    COMPLICATIONS:  None. SPECIMEN:  Pilonidal cyst.    OPERATIVE INDICATION AND CONSENT:  The patient is a 28-year-old male  with a pilonidal cyst.  He is brought in today for excision. He was  explained the risks, benefits and possible complications including risk  of recurrence of the disease and wound healing difficulties. DETAILS OF THE PROCEDURE:  The patient was brought to the operating  suite and after general endotracheal anesthesia, was placed in the prone  position. He was then prepped and draped in the usual sterile fashion. We made an elliptical skin incision oriented vertically over the midline  of the sacral region. This included all visible pilonidal disease. Dissection was undertaken using Bovie electrocautery. Once the specimen  was removed, further hemostasis was achieved with cautery. The cavity  was then irrigated. The deepest subcutaneous layer was closed with  interrupted 2-0 Vicryl sutures. The more superficial subcutaneous layer  was closed with interrupted 3-0 Vicryl sutures. The skin was closed  with 3-0 nylon vertical mattress sutures. A sterile dressing was  applied. The patient tolerated the procedure without difficulty and was  transferred to recovery room in stable condition.         Dilshad Angelo.

## 2020-08-12 NOTE — H&P
[facility]  History and Physical Update          I have reviewed the history and physical and examined the patient and find no relevant changes. I have reviewed with the patient and/or family the risks, benefits, and alternatives to the procedure.     /89   Pulse 60   Temp 97 °F (36.1 °C) (Temporal)   Resp 14   Ht 5' 8\" (1.727 m)   Wt 222 lb (100.7 kg)   SpO2 98%   BMI 33.75 kg/m²     Ruth Hernandez MD  8/12/2020

## 2020-08-12 NOTE — ANESTHESIA POSTPROCEDURE EVALUATION
Department of Anesthesiology  Postprocedure Note    Patient: Tad Ge  MRN: [id number]  YOB: 1980  Date of evaluation: 8/12/2020  Time:  3:43 PM     Procedure Summary     Date:  08/12/20 Room / Location:  [address]    Anesthesia Start:  4754 Anesthesia Stop:  1330    Procedure:  EXCISION OF PILONIDAL CYST (N/A Anus) Diagnosis:  (L05.01  PILONIDAL CYST WITH ABSCESS)    Surgeon:  Kareem Mari MD Responsible Provider:  Lazarus Bossier, MD    Anesthesia Type:  general ASA Status:  2          Anesthesia Type: general    Joyce Phase I: Joyce Score: 10    Joyce Phase II: Joyce Score: 10    Last vitals: Reviewed and per EMR flowsheets.        Anesthesia Post Evaluation    Patient location during evaluation: PACU  Patient participation: complete - patient participated  Level of consciousness: awake and alert  Airway patency: patent  Nausea & Vomiting: no nausea and no vomiting  Complications: no  Cardiovascular status: hemodynamically stable  Respiratory status: acceptable  Hydration status: stable

## 2020-08-12 NOTE — PROGRESS NOTES
CLINICAL PHARMACY NOTE: MEDS TO [address] Select Patient?: No  Total # of Prescriptions Filled: 1   The following medications were delivered to the patient:  · Hydrocodone 5/325mg  Total # of Interventions Completed: 0  Time Spent (min): 30    Additional Documentation:  Medication delivered- wife Sanjay Franco signed  Arash Mcdaniel

## 2020-08-12 NOTE — ANESTHESIA PRE PROCEDURE
Department of Anesthesiology  Preprocedure Note       Name:  Keshia Quintana   Age:  45 y.o.  :  1980                                          MRN:  [id number]         Date:  2020      Surgeon: Leonardo Fernández):  Sujata Ortiz MD    Procedure: Procedure(s):  EXCISION OF PILONIDAL CYST    Medications prior to admission:   Prior to Admission medications    Medication Sig Start Date End Date Taking? Authorizing Provider   omeprazole (PRILOSEC) 10 MG capsule Take 10 mg by mouth daily   Yes Historical Provider, MD   diclofenac (CATAFLAM) 50 MG tablet Take 1 tablet by mouth 3 times daily 19   Addie Salcido MD   diclofenac (VOLTAREN) 50 MG EC tablet Take 1 tablet by mouth 3 times daily (with meals) 19   Addie Salcido MD       Current medications:    No current facility-administered medications for this encounter. Allergies: Allergies   Allergen Reactions    Penicillins Rash       Problem List:    Patient Active Problem List   Diagnosis Code    Skin lesion L98.9       Past Medical History:        Diagnosis Date    GERD (gastroesophageal reflux disease)        Past Surgical History:        Procedure Laterality Date    COLONOSCOPY      COLONOSCOPY  2/3/16    diverticulosis    EYE SURGERY      orbital floor       Social History:    Social History     Tobacco Use    Smoking status: Former Smoker     Packs/day: 0.25     Years: 4.00     Pack years: 1.00     Types: Cigarettes     Last attempt to quit: 2018     Years since quittin.5    Smokeless tobacco: Former User     Types: Chew    Tobacco comment: socially   Substance Use Topics    Alcohol use:  Yes     Alcohol/week: 0.0 standard drinks     Comment: socially                                Counseling given: Not Answered  Comment: socially      Vital Signs (Current):   Vitals:    08/10/20 1223 20 1115   BP:  131/89   Pulse:  60   Resp:  14   Temp:  97 °F (36.1 °C)   TempSrc:  Temporal   SpO2:  98%   Weight: 230 lb asthma and shortness of breath                           Cardiovascular:  Exercise tolerance: good (>4 METS),       (-) hypertension and  angina                Neuro/Psych:      (-) CVA           GI/Hepatic/Renal:   (+) GERD:,      (-) liver disease       Endo/Other:        (-) diabetes mellitus, hypothyroidism               Abdominal:           Vascular:     - PVD. Anesthesia Plan      general     ASA 2       Induction: intravenous. MIPS: Postoperative opioids intended and Prophylactic antiemetics administered. Anesthetic plan and risks discussed with patient. Use of blood products discussed with patient whom. Plan discussed with CRNA.                   Deandre Salazar MD   8/12/2020

## 2020-08-12 NOTE — PROGRESS NOTES
Pt admitted to PACU, awakening and following following commands, coccyx with pallavi CD&I, medicated with dilaudid for pain, O2 saturations stable on simple mask 8L, NSR on tele, will monitor

## 2020-08-12 NOTE — PROGRESS NOTES
Pt awakening, Pain is \"better\" 6/10.  Gave Norco per orders,  tolerating Jemaxwello, kerry, pallavi CD&I, phase 1 discharge criteria

## 2020-08-13 ENCOUNTER — OFFICE VISIT (OUTPATIENT)
Dept: FAMILY MEDICINE CLINIC | Age: 40
End: 2020-08-13
Payer: COMMERCIAL

## 2020-08-13 VITALS — OXYGEN SATURATION: 99 % | HEART RATE: 87 BPM

## 2020-08-13 PROCEDURE — 99213 OFFICE O/P EST LOW 20 MIN: CPT | Performed by: FAMILY MEDICINE

## 2020-08-13 NOTE — PROGRESS NOTES
Exam  Vitals signs and nursing note reviewed. Constitutional:       Appearance: He is well-developed. HENT:      Head: Normocephalic and atraumatic. Right Ear: External ear normal.      Left Ear: External ear normal.      Nose: Nose normal.   Eyes:      Conjunctiva/sclera: Conjunctivae normal.      Pupils: Pupils are equal, round, and reactive to light. Neck:      Musculoskeletal: Normal range of motion and neck supple. Cardiovascular:      Rate and Rhythm: Normal rate and regular rhythm. Heart sounds: Normal heart sounds. No murmur. No friction rub. No gallop. Pulmonary:      Effort: Pulmonary effort is normal. No respiratory distress. Breath sounds: Normal breath sounds. No wheezing. Abdominal:      General: Bowel sounds are normal. There is no distension. Palpations: Abdomen is soft. Tenderness: There is no abdominal tenderness. Musculoskeletal: Normal range of motion. General: No tenderness. Skin:     General: Skin is warm and dry. Neurological:      Mental Status: He is alert and oriented to person, place, and time. Deep Tendon Reflexes: Reflexes are normal and symmetric. Psychiatric:         Behavior: Behavior normal.         Thought Content: Thought content normal.         Judgment: Judgment normal.         ASSESSMENT/PLAN:  1. Samantha Blas MD, Otolaryngology, [facility]      No follow-ups on file. An electronic signature was used to authenticate this note.     --Angelica Cranker, MD on 8/13/2020 at 4:22 PM

## 2020-08-17 NOTE — H&P
Kev Fermin    HPI: 45year old male here for excision of a pilonidal cyst      Past Medical History:   Diagnosis Date    GERD (gastroesophageal reflux disease)        Past Surgical History:   Procedure Laterality Date    COLONOSCOPY      COLONOSCOPY  2/3/16    diverticulosis    EYE SURGERY      orbital floor    PILONIDAL CYST EXCISION N/A 2020    EXCISION OF PILONIDAL CYST performed by Janene Shields MD at [address] [place] [address] Marital status:      Spouse name: Not on file    Number of children: Not on file    Years of education: Not on file    Highest education level: Not on file   Occupational History    Occupation: engineering, mechanical   Social Needs    Financial resource strain: Not on file    Food insecurity     Worry: Not on file     Inability: Not on file    Transportation needs     Medical: Not on file     Non-medical: Not on file   Tobacco Use    Smoking status: Former Smoker     Packs/day: 0.25     Years: 4.00     Pack years: 1.00     Types: Cigarettes     Last attempt to quit: 2018     Years since quittin.6    Smokeless tobacco: Former User     Types: Chew    Tobacco comment: socially   Substance and Sexual Activity    Alcohol use:  Yes     Alcohol/week: 0.0 standard drinks     Comment: socially    Drug use: No    Sexual activity: Yes     Partners: Female   Lifestyle    Physical activity     Days per week: Not on file     Minutes per session: Not on file    Stress: Not on file   Relationships    Social connections     Talks on phone: Not on file     Gets together: Not on file     Attends Mosque service: Not on file     Active member of club or organization: Not on file     Attends meetings of clubs or organizations: Not on file     Relationship status: Not on file    Intimate partner violence     Fear of current or ex partner: Not on file     Emotionally abused: Not on file     Physically abused: Not on file     Forced sexual activity: Not on file   Other Topics Concern    Not on file   Social History Narrative    Not on file       Allergies: Allergies   Allergen Reactions    Penicillins Rash       Prior to Admission medications    Medication Sig Start Date End Date Taking? Authorizing Provider   omeprazole (PRILOSEC) 10 MG capsule Take 10 mg by mouth daily   Yes Historical Provider, MD   diclofenac (CATAFLAM) 50 MG tablet Take 1 tablet by mouth 3 times daily 1/23/19   Lainey Angulo MD   diclofenac (VOLTAREN) 50 MG EC tablet Take 1 tablet by mouth 3 times daily (with meals) 1/23/19   Lainey Angulo MD       Active Problems:    * No active hospital problems. *  Resolved Problems:    * No resolved hospital problems. *      Blood pressure 132/69, pulse 78, temperature 96.9 °F (36.1 °C), temperature source Temporal, resp. rate 12, height 5' 8\" (1.727 m), weight 222 lb (100.7 kg), SpO2 96 %. Review of Systems    Physical Exam  Cardiovascular:      Rate and Rhythm: Normal rate and regular rhythm. Pulmonary:      Effort: Pulmonary effort is normal.      Breath sounds: Normal breath sounds.          Assessment:  67-year-old male with a pilonidal cyst    Plan:  Excision of pilonidal cyst    Rachele Hoffman MD  8/17/2020

## 2020-08-18 ENCOUNTER — TELEPHONE (OUTPATIENT)
Dept: ENT CLINIC | Age: 40
End: 2020-08-18

## 2020-08-18 ENCOUNTER — OFFICE VISIT (OUTPATIENT)
Dept: ENT CLINIC | Age: 40
End: 2020-08-18
Payer: COMMERCIAL

## 2020-08-18 VITALS
HEART RATE: 65 BPM | WEIGHT: 224.6 LBS | HEIGHT: 68 IN | BODY MASS INDEX: 34.04 KG/M2 | DIASTOLIC BLOOD PRESSURE: 80 MMHG | TEMPERATURE: 96.6 F | SYSTOLIC BLOOD PRESSURE: 130 MMHG | RESPIRATION RATE: 15 BRPM

## 2020-08-18 PROCEDURE — 31575 DIAGNOSTIC LARYNGOSCOPY: CPT | Performed by: OTOLARYNGOLOGY

## 2020-08-18 PROCEDURE — 99203 OFFICE O/P NEW LOW 30 MIN: CPT | Performed by: OTOLARYNGOLOGY

## 2020-08-18 NOTE — TELEPHONE ENCOUNTER
Called the [facility] to ask if we could have the patients sleep study sent over to us. They did not answer so I left a VM asking them to call me back at [phone].

## 2020-08-18 NOTE — PROGRESS NOTES
[address] [facility]  NEW PATIENT HISTORY AND PHYSICAL NOTE      Patient Name: Ermelinda Phillips Record Number:  [id number]  Primary Care Physician:  Lionel Maciel MD    ChiefComplaint     Chief Complaint   Patient presents with    Other     Patient states for more than a month he has been having some pain and pressure in his throat. States that he has no cough or drainage. States that cold foods and liquids make it feel a little better. States that occasionally he has trouble swallowing. Denies any ear pain. Denies headaches. History of Present Illness     Dhruv Arauz is an 45 y.o. male with throat pain that began 1 month ago with no URI/trauma,with progressive worsening. Pain dull in quality, 4-5/10 in intensity, worse with neck extension or lateral excursion. Improves with cold foods. Recently had pilonidal cyst removed on 8/12/2020 under general anesthesia- sore throat worsened after this, however he  was on hydrocodone with improvement in throat pain over the past week (last dose 2 days ago). Has odynophagia for all all consistencies (inermittent) with no dysphagia -this has markedly improved over the last 4-5 days. No referred otalgia, no fevers/chills/night sweats. Drinks 2c coffee daily, 1 can soda/day, moderate (every other day) use of spicy foods. GERD well controlled on omeprazole. Of note, patient with prior diagnosis of sleep apnea-was able to tolerate CPAP for approximately 1 month in 2016, however did not tolerate following this. BMI of 34.15, 17.25\" neck circumference.     Past Medical History     Past Medical History:   Diagnosis Date    GERD (gastroesophageal reflux disease)        Past Surgical History     Past Surgical History:   Procedure Laterality Date    COLONOSCOPY  2010    COLONOSCOPY  2/3/16    diverticulosis    EYE SURGERY      orbital floor    PILONIDAL CYST EXCISION N/A 8/12/2020    EXCISION OF PILONIDAL CYST performed by [facility]. History     Family History   Problem Relation Age of Onset    Stroke Maternal Grandfather     Diabetes Maternal Grandfather     Diabetes Maternal Grandmother     Diabetes Paternal Grandfather        Social History     Social History     Tobacco Use    Smoking status: Former Smoker     Packs/day: 0.25     Years: 4.00     Pack years: 1.00     Types: Cigarettes     Last attempt to quit: 2018     Years since quittin.6    Smokeless tobacco: Former User     Types: Chew    Tobacco comment: socially   Substance Use Topics    Alcohol use: Yes     Alcohol/week: 0.0 standard drinks     Comment: socially    Drug use: No        Allergies     Allergies   Allergen Reactions    Penicillins Rash       Medications     Current Outpatient Medications   Medication Sig Dispense Refill    diclofenac (CATAFLAM) 50 MG tablet Take 1 tablet by mouth 3 times daily 30 tablet 0    diclofenac (VOLTAREN) 50 MG EC tablet Take 1 tablet by mouth 3 times daily (with meals) 60 tablet 0    omeprazole (PRILOSEC) 10 MG capsule Take 10 mg by mouth daily       No current facility-administered medications for this visit.         Review of Systems     REVIEW OF SYSTEMS  The following systems were reviewed and revealed the following in addition to any already discussed in the HPI:    CONSTITUTIONAL: No weight loss, no fever, no night sweats, no chills  EYES: no vision changes, no blurry vision  EARS: no changes in hearing, no otalgia  NOSE: no epistaxis, no rhinorrhea  RESPIRATORY: No difficulty breathing, no shortness of breath  CV: no chest pain, no peripheral vascular disease  HEME: No coagulation disorder, no bleeding disorder  NEURO: No TIA or stroke-like symptoms  SKIN: No new rashes in the head and neck, no recent skin cancers  MOUTH: No new ulcers, no recent teeth infections  GASTROINTESTINAL: No diarrhea, stomach pain  PSYCH: No anxiety, no depression    PhysicalExam Nasopharyngolaryngoscopy  Surgeon: CHANNING Naylor  Anesthesia: Afrin with 2% lidocaine  Estimated Blood Loss: None    Procedure:   Flexible Laryngoscopy     After obtaining consent, the patient was placed in the examination chair in the upright position. Decongestant and topical anesthetic was sprayed in the After allowing adequate time for hemostatic effect, the flexible 4 mm laryngoscope was passed via the righ nasal passage    Nasal Septum: No acute septal deviation, no septal hematoma or perforations noted   Nasal Findings: No active hemorrhage, no nasal masses appreciated   Nasopharynx: Clear, no masses or inflammation  Oropharynx: Bilateral tonsillar tissue present, no exophytic masses  Base of Tongue: Lingual tonsils within normal limits, vallecula without effacement  Epiglottis: Upright, in normal anatomical position  True Vocal Cord: Anatomically normal, no masses or inflammation, normal abduction and adduction   False Vocal Cord: normal   Hypopharynx Mucosa: No masses or inflammation of the piriform sinuses or postcricoid area  Arytenoids: Normal mucosa, no dislocation appreciated     * Patient tolerated the procedure well with no complications   * Patient was instructed not to eat for 30 minutes following procedure. * Patient was instructed that they may notice minor bleeding. Attestation:   I was present for the entire viewing, including introduction and removal of the scope. Reflux Finding Score:  Subglottic edema 0?=?absent  2? =?present    Ventricular obl. 2?=?partial  4? =?complete    Erythema/hyperemia 2? =?arytenoids only  4? =?diffuse    Vocal fold edema 1? =?mild  2? =?moderate  3? =?severe  4? =?polypoid    Diffuse laryngeal  1? =?mild  edema   2? =?moderate  3? =?severe  4?=?obstructing    Posterior   1? =?mild  commissure   2? =?moderate  hypertrophy  3? =?severe  4?=?obstructing    Granuloma/  0?=?absent  granulation tissue 2? =?present    Thick endolaryngeal the use of this program.

## 2020-08-19 NOTE — TELEPHONE ENCOUNTER
Suly with [facility] called after receiving my VM and which records we needed. I asked if she could send the sleep study results to our fax machine. I gave her the fax number [phone].  She gave me the direct line to her phone incase I need more information, Her number is [phone]

## 2020-08-19 NOTE — TELEPHONE ENCOUNTER
Attempted to call the [facility] again in regards to the patient sleep study results. They did not answer so I left a VM asking them to call us back at [phone].

## 2020-08-19 NOTE — TELEPHONE ENCOUNTER
Called Suly with [facility] to let her know we did not receive the fax. She did not answer so I left a message asking her to call us back at [phone].

## 2020-08-24 ENCOUNTER — OFFICE VISIT (OUTPATIENT)
Dept: SURGERY | Age: 40
End: 2020-08-24

## 2020-08-24 VITALS
TEMPERATURE: 97.6 F | BODY MASS INDEX: 34.58 KG/M2 | WEIGHT: 227.4 LBS | DIASTOLIC BLOOD PRESSURE: 70 MMHG | SYSTOLIC BLOOD PRESSURE: 127 MMHG

## 2020-08-24 PROCEDURE — 99024 POSTOP FOLLOW-UP VISIT: CPT | Performed by: SURGERY

## 2021-02-04 ENCOUNTER — OFFICE VISIT (OUTPATIENT)
Dept: FAMILY MEDICINE CLINIC | Age: 41
End: 2021-02-04
Payer: COMMERCIAL

## 2021-02-04 ENCOUNTER — NURSE TRIAGE (OUTPATIENT)
Dept: OTHER | Facility: CLINIC | Age: 41
End: 2021-02-04

## 2021-02-04 VITALS
DIASTOLIC BLOOD PRESSURE: 64 MMHG | WEIGHT: 229 LBS | SYSTOLIC BLOOD PRESSURE: 122 MMHG | OXYGEN SATURATION: 98 % | HEART RATE: 82 BPM | BODY MASS INDEX: 34.82 KG/M2 | TEMPERATURE: 96.8 F

## 2021-02-04 DIAGNOSIS — G43.909 MIGRAINE WITHOUT STATUS MIGRAINOSUS, NOT INTRACTABLE, UNSPECIFIED MIGRAINE TYPE: Primary | ICD-10-CM

## 2021-02-04 PROCEDURE — 99213 OFFICE O/P EST LOW 20 MIN: CPT | Performed by: NURSE PRACTITIONER

## 2021-02-04 RX ORDER — RIBOFLAVIN (VITAMIN B2) 400 MG
1 TABLET ORAL DAILY
Qty: 30 TABLET | Refills: 2 | Status: SHIPPED | OUTPATIENT
Start: 2021-02-04 | End: 2021-09-28

## 2021-02-04 RX ORDER — SUMATRIPTAN 50 MG/1
TABLET, FILM COATED ORAL
Qty: 9 TABLET | Refills: 3 | Status: SHIPPED | OUTPATIENT
Start: 2021-02-04 | End: 2021-09-28

## 2021-02-04 RX ORDER — CALCIUM CARBONATE 300MG(750)
1 TABLET,CHEWABLE ORAL DAILY
Qty: 30 TABLET | Refills: 2 | Status: SHIPPED | OUTPATIENT
Start: 2021-02-04 | End: 2021-09-28

## 2021-02-04 ASSESSMENT — ENCOUNTER SYMPTOMS
GASTROINTESTINAL NEGATIVE: 1
RESPIRATORY NEGATIVE: 1
PHOTOPHOBIA: 1

## 2021-02-04 ASSESSMENT — PATIENT HEALTH QUESTIONNAIRE - PHQ9
SUM OF ALL RESPONSES TO PHQ QUESTIONS 1-9: 2
SUM OF ALL RESPONSES TO PHQ QUESTIONS 1-9: 2
SUM OF ALL RESPONSES TO PHQ9 QUESTIONS 1 & 2: 2

## 2021-02-04 NOTE — PROGRESS NOTES
Patient: Cynthia Lindsay is a 45 y.o. male who presents today with the following Chief Complaint(s):  Chief Complaint   Patient presents with    Blurred Vision     intermittent, lasting about a month, lasts for 15 minutes at a time, once every other day, increasing in frequency.  Headache         HPI   Patient presents today with ongoing headache and blurry vision. He states he gets this off and on every few months. When he does get it it does last for a little while. He has tried Ibuprofen in the past with out success. He has also tried fioricet. He had an MRI done in 2019 and it was normal. He was having the same symptoms then. It is unilateral right sided headache with blurry vision. He some times has sensitivity to light. He denies any nausea. He states he gets this once every few months. He denies any family history of migraines. Current Outpatient Medications   Medication Sig Dispense Refill    Magnesium 400 MG TABS Take 1 tablet by mouth daily 30 tablet 2    Riboflavin 400 MG TABS Take 1 tablet by mouth daily 30 tablet 2    Coenzyme Q10 (COQ-10) 100 MG CAPS Take 1 capsule by mouth daily 30 capsule 2    SUMAtriptan (IMITREX) 50 MG tablet Take 1 at the onset of the headache. May repeat in 2 hours if needed. Do not exceed 2 doses on 24 hours. 9 tablet 3    omeprazole (PRILOSEC) 10 MG capsule Take 10 mg by mouth daily       No current facility-administered medications for this visit. Patient's past medical history, surgical history, family history, medications,and allergies  were all reviewed and updated as appropriate today. Review of Systems   Constitutional: Negative. HENT: Negative. Eyes: Positive for photophobia and visual disturbance. Respiratory: Negative. Cardiovascular: Negative. Gastrointestinal: Negative. Musculoskeletal: Negative. Skin: Negative. Neurological: Positive for headaches. Psychiatric/Behavioral: Negative.           Physical Exam  Vitals signs reviewed. Eyes:      Extraocular Movements: Extraocular movements intact. Conjunctiva/sclera: Conjunctivae normal.      Pupils: Pupils are equal, round, and reactive to light. Cardiovascular:      Rate and Rhythm: Normal rate and regular rhythm. Heart sounds: Normal heart sounds. Pulmonary:      Effort: Pulmonary effort is normal.      Breath sounds: Normal breath sounds. Skin:     General: Skin is warm and dry. Neurological:      Mental Status: He is alert and oriented to person, place, and time. Vitals:    02/04/21 1334   BP: 122/64   Pulse: 82   Temp: 96.8 °F (36 °C)   SpO2: 98%       Assessment:  Encounter Diagnosis   Name Primary?  Migraine without status migrainosus, not intractable, unspecified migraine type Yes       Plan:  1. Migraine without status migrainosus, not intractable, unspecified migraine type  Will start on daily vitamins listed below. Will also give Imitrex as needed. Side effects and uses dicussed and patient verbalized his understanding. Will follow up with Dr. Sara Lebron in 1 month. - Magnesium 400 MG TABS; Take 1 tablet by mouth daily  Dispense: 30 tablet; Refill: 2  - Riboflavin 400 MG TABS; Take 1 tablet by mouth daily  Dispense: 30 tablet; Refill: 2  - Coenzyme Q10 (COQ-10) 100 MG CAPS; Take 1 capsule by mouth daily  Dispense: 30 capsule; Refill: 2  - SUMAtriptan (IMITREX) 50 MG tablet; Take 1 at the onset of the headache. May repeat in 2 hours if needed. Do not exceed 2 doses on 24 hours. Dispense: 9 tablet;  Refill: 3

## 2021-02-04 NOTE — TELEPHONE ENCOUNTER
Reason for Disposition   Loss of vision or double vision    Answer Assessment - Initial Assessment Questions  1. DESCRIPTION: \"Describe your dizziness. \"      Comes and goes  2. LIGHTHEADED: \"Do you feel lightheaded? \" (e.g., somewhat faint, woozy, weak upon standing)      no  3. VERTIGO: \"Do you feel like either you or the room is spinning or tilting? \" (i.e. vertigo)      no  4. SEVERITY: \"How bad is it? \"  \"Do you feel like you are going to faint? \" \"Can you stand and walk? \"    - MILD - walking normally    - MODERATE - interferes with normal activities (e.g., work, school)     - SEVERE - unable to stand, requires support to walk, feels like passing out now.       mild  5. ONSET:  \"When did the dizziness begin? \"      Valentine List dealing with this his whole life but states that it has gotten worse  6. AGGRAVATING FACTORS: \"Does anything make it worse? \" (e.g., standing, change in head position)      Not sure  7. HEART RATE: \"Can you tell me your heart rate? \" \"How many beats in 15 seconds? \"  (Note: not all patients can do this)        Not able to check  8. CAUSE: \"What do you think is causing the dizziness? \"      Not sure  9. RECURRENT SYMPTOM: \"Have you had dizziness before? \" If so, ask: \"When was the last time? \" \"What happened that time? \"      yes  10. OTHER SYMPTOMS: \"Do you have any other symptoms? \" (e.g., fever, chest pain, vomiting, diarrhea, bleeding)        Headache and blurred vision  11. PREGNANCY: \"Is there any chance you are pregnant? \" \"When was your last menstrual period? \"        n/a    Protocols used: [id number]-LB

## 2021-02-04 NOTE — TELEPHONE ENCOUNTER
POD 1 Evelyne Maloney  Blurred vision  Headache  Dizziness  Has been an issue his entire life    Abbi Dickinson

## 2021-07-13 ENCOUNTER — NURSE TRIAGE (OUTPATIENT)
Dept: OTHER | Facility: CLINIC | Age: 41
End: 2021-07-13

## 2021-07-13 ENCOUNTER — APPOINTMENT (OUTPATIENT)
Dept: CT IMAGING | Age: 41
End: 2021-07-13
Payer: COMMERCIAL

## 2021-07-13 ENCOUNTER — HOSPITAL ENCOUNTER (EMERGENCY)
Age: 41
Discharge: HOME | End: 2021-07-13
Attending: EMERGENCY MEDICINE
Payer: COMMERCIAL

## 2021-07-13 VITALS
DIASTOLIC BLOOD PRESSURE: 75 MMHG | TEMPERATURE: 99 F | OXYGEN SATURATION: 95 % | RESPIRATION RATE: 18 BRPM | HEART RATE: 75 BPM | WEIGHT: 225 LBS | BODY MASS INDEX: 34.1 KG/M2 | SYSTOLIC BLOOD PRESSURE: 133 MMHG | HEIGHT: 68 IN

## 2021-07-13 DIAGNOSIS — K57.32 DIVERTICULITIS OF COLON: Primary | ICD-10-CM

## 2021-07-13 LAB
A/G RATIO: 1.3 (ref 1.1–2.2)
ALBUMIN SERPL-MCNC: 4.6 G/DL (ref 3.4–5)
ALP BLD-CCNC: 66 U/L (ref 40–129)
ALT SERPL-CCNC: 40 U/L (ref 10–40)
ANION GAP SERPL CALCULATED.3IONS-SCNC: 15 MMOL/L (ref 3–16)
AST SERPL-CCNC: 30 U/L (ref 15–37)
BASOPHILS ABSOLUTE: 0.1 K/UL (ref 0–0.2)
BASOPHILS RELATIVE PERCENT: 0.6 %
BILIRUB SERPL-MCNC: 0.9 MG/DL (ref 0–1)
BUN BLDV-MCNC: 12 MG/DL (ref 7–20)
CALCIUM SERPL-MCNC: 9.7 MG/DL (ref 8.3–10.6)
CHLORIDE BLD-SCNC: 101 MMOL/L (ref 99–110)
CO2: 20 MMOL/L (ref 21–32)
CREAT SERPL-MCNC: 0.7 MG/DL (ref 0.9–1.3)
EOSINOPHILS ABSOLUTE: 0 K/UL (ref 0–0.6)
EOSINOPHILS RELATIVE PERCENT: 0.1 %
GFR AFRICAN AMERICAN: >60
GFR NON-AFRICAN AMERICAN: >60
GLOBULIN: 3.6 G/DL
GLUCOSE BLD-MCNC: 125 MG/DL (ref 70–99)
HCT VFR BLD CALC: 44.3 % (ref 40.5–52.5)
HEMOGLOBIN: 15.5 G/DL (ref 13.5–17.5)
LIPASE: 22 U/L (ref 13–60)
LYMPHOCYTES ABSOLUTE: 3.9 K/UL (ref 1–5.1)
LYMPHOCYTES RELATIVE PERCENT: 19.8 %
MCH RBC QN AUTO: 31.2 PG (ref 26–34)
MCHC RBC AUTO-ENTMCNC: 35 G/DL (ref 31–36)
MCV RBC AUTO: 89.2 FL (ref 80–100)
MONOCYTES ABSOLUTE: 1.2 K/UL (ref 0–1.3)
MONOCYTES RELATIVE PERCENT: 6 %
NEUTROPHILS ABSOLUTE: 14.4 K/UL (ref 1.7–7.7)
NEUTROPHILS RELATIVE PERCENT: 73.5 %
PDW BLD-RTO: 12.9 % (ref 12.4–15.4)
PLATELET # BLD: 253 K/UL (ref 135–450)
PMV BLD AUTO: 9.4 FL (ref 5–10.5)
POTASSIUM REFLEX MAGNESIUM: 4.7 MMOL/L (ref 3.5–5.1)
RBC # BLD: 4.96 M/UL (ref 4.2–5.9)
SODIUM BLD-SCNC: 136 MMOL/L (ref 136–145)
TOTAL PROTEIN: 8.2 G/DL (ref 6.4–8.2)
WBC # BLD: 19.6 K/UL (ref 4–11)

## 2021-07-13 PROCEDURE — 99284 EMERGENCY DEPT VISIT MOD MDM: CPT

## 2021-07-13 PROCEDURE — 83690 ASSAY OF LIPASE: CPT

## 2021-07-13 PROCEDURE — 96374 THER/PROPH/DIAG INJ IV PUSH: CPT

## 2021-07-13 PROCEDURE — 96375 TX/PRO/DX INJ NEW DRUG ADDON: CPT

## 2021-07-13 PROCEDURE — 6360000002 HC RX W HCPCS: Performed by: EMERGENCY MEDICINE

## 2021-07-13 PROCEDURE — 6360000004 HC RX CONTRAST MEDICATION: Performed by: EMERGENCY MEDICINE

## 2021-07-13 PROCEDURE — 74177 CT ABD & PELVIS W/CONTRAST: CPT

## 2021-07-13 PROCEDURE — 85025 COMPLETE CBC W/AUTO DIFF WBC: CPT

## 2021-07-13 PROCEDURE — 2580000003 HC RX 258: Performed by: EMERGENCY MEDICINE

## 2021-07-13 PROCEDURE — 80053 COMPREHEN METABOLIC PANEL: CPT

## 2021-07-13 PROCEDURE — 6370000000 HC RX 637 (ALT 250 FOR IP): Performed by: EMERGENCY MEDICINE

## 2021-07-13 RX ORDER — METRONIDAZOLE 500 MG/1
500 TABLET ORAL 2 TIMES DAILY
Qty: 14 TABLET | Refills: 0 | Status: SHIPPED | OUTPATIENT
Start: 2021-07-13 | End: 2021-07-26 | Stop reason: SDUPTHER

## 2021-07-13 RX ORDER — ONDANSETRON 4 MG/1
4 TABLET, FILM COATED ORAL 3 TIMES DAILY PRN
Qty: 15 TABLET | Refills: 0 | Status: SHIPPED | OUTPATIENT
Start: 2021-07-13 | End: 2021-09-28

## 2021-07-13 RX ORDER — METRONIDAZOLE 250 MG/1
500 TABLET ORAL ONCE
Status: COMPLETED | OUTPATIENT
Start: 2021-07-13 | End: 2021-07-13

## 2021-07-13 RX ORDER — 0.9 % SODIUM CHLORIDE 0.9 %
1000 INTRAVENOUS SOLUTION INTRAVENOUS ONCE
Status: COMPLETED | OUTPATIENT
Start: 2021-07-13 | End: 2021-07-13

## 2021-07-13 RX ORDER — CIPROFLOXACIN 500 MG/1
500 TABLET, FILM COATED ORAL 2 TIMES DAILY
Qty: 20 TABLET | Refills: 0 | Status: SHIPPED | OUTPATIENT
Start: 2021-07-13 | End: 2021-07-26 | Stop reason: SDUPTHER

## 2021-07-13 RX ORDER — CIPROFLOXACIN 500 MG/1
500 TABLET, FILM COATED ORAL ONCE
Status: COMPLETED | OUTPATIENT
Start: 2021-07-13 | End: 2021-07-13

## 2021-07-13 RX ORDER — HYDROCODONE BITARTRATE AND ACETAMINOPHEN 5; 325 MG/1; MG/1
1 TABLET ORAL EVERY 6 HOURS PRN
Qty: 12 TABLET | Refills: 0 | Status: SHIPPED | OUTPATIENT
Start: 2021-07-13 | End: 2021-07-26 | Stop reason: SDUPTHER

## 2021-07-13 RX ORDER — KETOROLAC TROMETHAMINE 30 MG/ML
30 INJECTION, SOLUTION INTRAMUSCULAR; INTRAVENOUS ONCE
Status: COMPLETED | OUTPATIENT
Start: 2021-07-13 | End: 2021-07-13

## 2021-07-13 RX ORDER — ONDANSETRON 2 MG/ML
4 INJECTION INTRAMUSCULAR; INTRAVENOUS ONCE
Status: COMPLETED | OUTPATIENT
Start: 2021-07-13 | End: 2021-07-13

## 2021-07-13 RX ADMIN — ONDANSETRON 4 MG: 2 INJECTION INTRAMUSCULAR; INTRAVENOUS at 08:20

## 2021-07-13 RX ADMIN — IOPAMIDOL 75 ML: 755 INJECTION, SOLUTION INTRAVENOUS at 08:46

## 2021-07-13 RX ADMIN — CIPROFLOXACIN 500 MG: 500 TABLET, FILM COATED ORAL at 09:33

## 2021-07-13 RX ADMIN — SODIUM CHLORIDE 1000 ML: 9 INJECTION, SOLUTION INTRAVENOUS at 08:30

## 2021-07-13 RX ADMIN — METRONIDAZOLE 500 MG: 250 TABLET ORAL at 09:33

## 2021-07-13 RX ADMIN — KETOROLAC TROMETHAMINE 30 MG: 30 INJECTION, SOLUTION INTRAMUSCULAR; INTRAVENOUS at 08:21

## 2021-07-13 ASSESSMENT — PAIN SCALES - GENERAL
PAINLEVEL_OUTOF10: 3
PAINLEVEL_OUTOF10: 6
PAINLEVEL_OUTOF10: 3
PAINLEVEL_OUTOF10: 6

## 2021-07-13 ASSESSMENT — PAIN DESCRIPTION - DESCRIPTORS: DESCRIPTORS: CONSTANT

## 2021-07-13 ASSESSMENT — PAIN DESCRIPTION - ORIENTATION: ORIENTATION: LOWER;MID

## 2021-07-13 ASSESSMENT — PAIN DESCRIPTION - LOCATION: LOCATION: ABDOMEN

## 2021-07-13 ASSESSMENT — PAIN DESCRIPTION - PAIN TYPE: TYPE: ACUTE PAIN

## 2021-07-13 NOTE — TELEPHONE ENCOUNTER
Received call from [address] [facility]. Brief description of triage: abdominal pain    Triage indicates for patient to [address] or office with PCP approval. Office currently closed. Pt indicated to go to ED, agreeable with plan. Care advice provided, patient verbalizes understanding; denies any other questions or concerns; instructed to call back for any new or worsening symptoms. Attention Provider: Thank you for allowing me to participate in the care of your patient. The patient was connected to triage in response to information provided to the ECC. Please do not respond through this encounter as the response is not directed to a shared pool. Reason for Disposition   Constant abdominal pain lasting > 2 hours    Answer Assessment - Initial Assessment Questions  1. LOCATION: \"Where does it hurt? \"       Pelvic area. Center. 2. RADIATION: \"Does the pain shoot anywhere else? \" (e.g., chest, back)      Does not to scrotum. 3. ONSET: \"When did the pain begin? \" (Minutes, hours or days ago)       Friday. 4. SUDDEN: \"Gradual or sudden onset? \"      Gradual    5. PATTERN \"Does the pain come and go, or is it constant? \"     - If constant: \"Is it getting better, staying the same, or worsening? \"       (Note: Constant means the pain never goes away completely; most serious pain is constant and it progresses)      - If intermittent: \"How long does it last?\" \"Do you have pain now? \"      (Note: Intermittent means the pain goes away completely between bouts)      Constant dull pain. Intermittent sharp pain. 6. SEVERITY: \"How bad is the pain? \"  (e.g., Scale 1-10; mild, moderate, or severe)     - MILD (1-3): doesn't interfere with normal activities, abdomen soft and not tender to touch      - MODERATE (4-7): interferes with normal activities or awakens from sleep, tender to touch      - SEVERE (8-10): excruciating pain, doubled over, unable to do any normal activities Better today than yesterday. 7-8/10. Has not taken anything for pain. 7. RECURRENT SYMPTOM: \"Have you ever had this type of abdominal pain before? \" If so, ask: \"When was the last time? \" and \"What happened that time? \"      Denies history of abd pain. History of migraines but states this is different. 8. CAUSE: \"What do you think is causing the abdominal pain? \"      Pt states unknown cause for abd pain. States thinks he has a possible bowel obstruction. States took Miralax yesterday and unable to have BM. Last BM Saturday. States cannot recall what it looked like. Denies abd distension. States hurts to the touch. Soft to palpation. 9. RELIEVING/AGGRAVATING FACTORS: \"What makes it better or worse? \" (e.g., movement, antacids, bowel movement)      Laying down makes pain worse. States gets hot. Up an down. 10. OTHER SYMPTOMS: \"Has there been any vomiting, diarrhea, constipation, or urine problems? \"        Headache, constant. Frontal headache. Denies urinary complaints. Intermittent nausea without emesis.     Protocols used: ABDOMINAL PAIN - MALE-ADULT-OH

## 2021-07-13 NOTE — ED PROVIDER NOTES
RIBOFLAVIN 400 MG TABS    Take 1 tablet by mouth daily    SUMATRIPTAN (IMITREX) 50 MG TABLET    Take 1 at the onset of the headache. May repeat in 2 hours if needed. Do not exceed 2 doses on 24 hours. ALLERGIES     Penicillins    FAMILY HISTORY       Family History   Problem Relation Age of Onset    High Blood Pressure Mother     Stroke Maternal Grandfather     Diabetes Maternal Grandfather     Diabetes Maternal Grandmother     Diabetes Paternal Grandfather     High Blood Pressure Brother           SOCIAL HISTORY       Social History     Socioeconomic History    Marital status:      Spouse name: None    Number of children: None    Years of education: None    Highest education level: None   Occupational History    Occupation: engineering, mechanical   Tobacco Use    Smoking status: Former Smoker     Packs/day: 0.25     Years: 4.00     Pack years: 1.00     Types: Cigarettes     Quit date: 1/8/2018     Years since quitting: 3.5    Smokeless tobacco: Former User     Types: Chew    Tobacco comment: socially   Vaping Use    Vaping Use: Never used   Substance and Sexual Activity    Alcohol use: Yes     Alcohol/week: 0.0 standard drinks     Comment: socially    Drug use: No    Sexual activity: Yes     Partners: Female   Other Topics Concern    None   Social History Narrative    None     Social Determinants of Health     Financial Resource Strain:     Difficulty of Paying Living Expenses:    Food Insecurity:     Worried About Running Out of Food in the Last Year:     Ran Out of Food in the Last Year:    Transportation Needs:     Lack of Transportation (Medical):      Lack of Transportation (Non-Medical):    Physical Activity:     Days of Exercise per Week:     Minutes of Exercise per Session:    Stress:     Feeling of Stress :    Social Connections:     Frequency of Communication with Friends and Family:     Frequency of Social Gatherings with Friends and Family:     Attends [employer] Services:     Active Member of Clubs or Organizations:     Attends Club or Organization Meetings:     Marital Status:    Intimate Partner Violence:     Fear of Current or Ex-Partner:     Emotionally Abused:     Physically Abused:     Sexually Abused:        SCREENINGS               PHYSICAL EXAM    (up to 7 for level 4, 8 or more for level 5)     ED Triage Vitals [07/13/21 0751]   BP Temp Temp Source Pulse Resp SpO2 Height Weight   (!) 147/83 99 °F (37.2 °C) Oral 76 18 96 % 5' 8\" (1.727 m) 225 lb (102.1 kg)       Physical Exam    General appearance:  Cooperative. No acute distress. Skin:  Warm. Dry. Eye:  Extraocular movements intact. Ears, nose, mouth and throat:  Oral mucosa moist,  Neck:  Trachea midline. Heart:  Regular rate and rhythm  Perfusion:  intact  Respiratory:  Lungs clear to auscultation bilaterally. Respirations nonlabored. Abdominal:   Non distended. Overall abdomen is soft however there is tenderness to palpation in the bilateral lower quadrants and suprapubic region. Mild amount of guarding. No rebound tenderness  Neurological:  Alert and oriented x 3.   Moves all extremities spontaneously  Musculoskeletal:   Normal ROM, no deformities          Psychiatric:  Normal mood      DIAGNOSTIC RESULTS       Labs Reviewed   CBC WITH AUTO DIFFERENTIAL - Abnormal; Notable for the following components:       Result Value    WBC 19.6 (*)     Neutrophils Absolute 14.4 (*)     All other components within normal limits    Narrative:     Performed at:  [address]   Phone [phone]   COMPREHENSIVE METABOLIC PANEL W/ REFLEX TO MG FOR LOW K - Abnormal; Notable for the following components:    CO2 20 (*)     Glucose 125 (*)     CREATININE 0.7 (*)     All other components within normal limits    Narrative:     Performed at:  [facility]) - [address] MD  [address] [place] [address]. [address] [place] [address]  [phone]    Schedule an appointment as soon as possible for a visit         DISCHARGE MEDICATIONS:  New Prescriptions    CIPROFLOXACIN (CIPRO) 500 MG TABLET    Take 1 tablet by mouth 2 times daily for 7 days    HYDROCODONE-ACETAMINOPHEN (NORCO) 5-325 MG PER TABLET    Take 1 tablet by mouth every 6 hours as needed for Pain for up to 3 days. Intended supply: 3 days. Take lowest dose possible to manage pain    METRONIDAZOLE (FLAGYL) 500 MG TABLET    Take 1 tablet by mouth 2 times daily for 7 days    ONDANSETRON (ZOFRAN) 4 MG TABLET    Take 1 tablet by mouth 3 times daily as needed for Nausea or Vomiting     Controlled Substances Monitoring:     No flowsheet data found.     (Please note that portions of this note were completed with a voice recognition program.  Efforts were made to edit the dictations but occasionally words are mis-transcribed.)    Travis Juárez MD (electronically signed)  Attending Emergency Physician            Starlette Number, MD  07/13/21 8058

## 2021-07-26 ENCOUNTER — OFFICE VISIT (OUTPATIENT)
Dept: FAMILY MEDICINE CLINIC | Age: 41
End: 2021-07-26
Payer: COMMERCIAL

## 2021-07-26 VITALS
SYSTOLIC BLOOD PRESSURE: 130 MMHG | HEIGHT: 68 IN | DIASTOLIC BLOOD PRESSURE: 80 MMHG | WEIGHT: 218 LBS | HEART RATE: 62 BPM | BODY MASS INDEX: 33.04 KG/M2 | OXYGEN SATURATION: 99 %

## 2021-07-26 DIAGNOSIS — K57.32 DIVERTICULITIS OF COLON: ICD-10-CM

## 2021-07-26 PROCEDURE — 99214 OFFICE O/P EST MOD 30 MIN: CPT | Performed by: FAMILY MEDICINE

## 2021-07-26 RX ORDER — METRONIDAZOLE 500 MG/1
500 TABLET ORAL 2 TIMES DAILY
Qty: 20 TABLET | Refills: 0 | Status: SHIPPED | OUTPATIENT
Start: 2021-07-26 | End: 2021-08-30 | Stop reason: SDUPTHER

## 2021-07-26 RX ORDER — CIPROFLOXACIN 500 MG/1
500 TABLET, FILM COATED ORAL 2 TIMES DAILY
Qty: 20 TABLET | Refills: 0 | Status: SHIPPED | OUTPATIENT
Start: 2021-07-26 | End: 2021-08-30 | Stop reason: SDUPTHER

## 2021-07-26 RX ORDER — HYDROCODONE BITARTRATE AND ACETAMINOPHEN 5; 325 MG/1; MG/1
1 TABLET ORAL EVERY 6 HOURS PRN
Qty: 12 TABLET | Refills: 0 | Status: SHIPPED | OUTPATIENT
Start: 2021-07-26 | End: 2021-08-02 | Stop reason: SDUPTHER

## 2021-08-02 DIAGNOSIS — K57.32 DIVERTICULITIS OF COLON: ICD-10-CM

## 2021-08-02 RX ORDER — HYDROCODONE BITARTRATE AND ACETAMINOPHEN 5; 325 MG/1; MG/1
1 TABLET ORAL EVERY 6 HOURS PRN
Qty: 12 TABLET | Refills: 0 | Status: SHIPPED | OUTPATIENT
Start: 2021-08-02 | End: 2021-08-30 | Stop reason: SDUPTHER

## 2021-08-02 NOTE — TELEPHONE ENCOUNTER
Received call from patient requesting HYDROcodone-acetaminophen (Desmond Brunson) 5-325 MG per tablet    Last Office Visit  -  7/26/21  Next Office Visit  -  None

## 2021-08-02 NOTE — TELEPHONE ENCOUNTER
Last Office Visit  -  7/26/21  Next Office Visit  -  none    Last Filled  -  7/26/21  Last UDS -  none  Contract -  none

## 2021-08-03 NOTE — PROGRESS NOTES
Luis Enrique Smith (:  1980) is a 37 y.o. male,Established patient, here for evaluation of the following chief complaint(s):  Follow-Up from Hospital ()         ASSESSMENT/PLAN:  1. Diverticulitis of colon  -     ciprofloxacin (CIPRO) 500 MG tablet; Take 1 tablet by mouth 2 times daily for 10 days, Disp-20 tablet, R-0Normal  -     metroNIDAZOLE (FLAGYL) 500 MG tablet; Take 1 tablet by mouth 2 times daily for 10 days, Disp-20 tablet, R-0Normal  Complete another course of antibiotics and mild amount of pain medicine was given to him today. He will follow up if not improving. Did discuss that if this is an ongoing issue that surgical options may be necessary. No follow-ups on file. Subjective   SUBJECTIVE/OBJECTIVE:  Tenisha Luna is a 37 y.o. male. Patient presents with: Follow-Up from Hospital: 913      43-year-old male who was seen in the ED for acute abdominal pain. CT scan was performed and was found to have evidence of diverticulitis. Patient was treated with Cipro and Flagyl and sent home. He had a symptoms declined and then resolved eventually. He finished his course of antibiotics and is now noting that his symptoms are recurring to a degree. He notices pain mostly in the suprapubic area a little bit to the right side. Patient notes no change in his stool. He has no constipation or diarrhea. He denies any fevers. No chills. No sweats. Patient has otherwise been doing well recently without any other complaints. The patients PMH, surgical history, family history, medications, allergies were all reviewed and updated as appropriate today. Review of Systems       Objective   Physical Exam  Vitals and nursing note reviewed. Constitutional:       Appearance: Normal appearance. He is well-developed. HENT:      Head: Normocephalic and atraumatic.       Right Ear: External ear normal.      Left Ear: External ear normal.      Nose: Nose normal.   Eyes: Conjunctiva/sclera: Conjunctivae normal.      Pupils: Pupils are equal, round, and reactive to light. Cardiovascular:      Rate and Rhythm: Normal rate and regular rhythm. Heart sounds: Normal heart sounds. No murmur heard. No friction rub. No gallop. Pulmonary:      Effort: Pulmonary effort is normal. No respiratory distress. Breath sounds: Normal breath sounds. No wheezing. Abdominal:      General: Bowel sounds are normal. There is no distension. Palpations: Abdomen is soft. Tenderness: There is no abdominal tenderness. Comments: Mild suprapubic tenderness with no rebound or guarding. Musculoskeletal:         General: No tenderness. Normal range of motion. Cervical back: Normal range of motion and neck supple. Skin:     General: Skin is warm and dry. Neurological:      Mental Status: He is alert and oriented to person, place, and time. Deep Tendon Reflexes: Reflexes are normal and symmetric. Psychiatric:         Behavior: Behavior normal.         Thought Content: Thought content normal.         Judgment: Judgment normal.              An electronic signature was used to authenticate this note.     --Tim Ricks MD

## 2021-08-30 ENCOUNTER — TELEPHONE (OUTPATIENT)
Dept: FAMILY MEDICINE CLINIC | Age: 41
End: 2021-08-30

## 2021-08-30 DIAGNOSIS — K57.32 DIVERTICULITIS OF COLON: ICD-10-CM

## 2021-08-30 RX ORDER — HYDROCODONE BITARTRATE AND ACETAMINOPHEN 5; 325 MG/1; MG/1
1 TABLET ORAL EVERY 6 HOURS PRN
Qty: 12 TABLET | Refills: 0 | Status: SHIPPED | OUTPATIENT
Start: 2021-08-30 | End: 2022-04-18 | Stop reason: SDUPTHER

## 2021-08-30 RX ORDER — CIPROFLOXACIN 500 MG/1
500 TABLET, FILM COATED ORAL 2 TIMES DAILY
Qty: 20 TABLET | Refills: 0 | Status: SHIPPED | OUTPATIENT
Start: 2021-08-30 | End: 2022-04-18 | Stop reason: SDUPTHER

## 2021-08-30 RX ORDER — METRONIDAZOLE 500 MG/1
500 TABLET ORAL 2 TIMES DAILY
Qty: 20 TABLET | Refills: 0 | Status: SHIPPED | OUTPATIENT
Start: 2021-08-30 | End: 2022-04-18 | Stop reason: SDUPTHER

## 2021-08-30 NOTE — TELEPHONE ENCOUNTER
Pt requesting Refills because he is having Diverticulitis flare up.  metroNIDAZOLE (FLAGYL) tablet 500 mg       ciprofloxacin (CIPRO) 500 MG tablet     HYDROcodone-acetaminophen (NORCO) 5-325 MG per tablet     Last Office Visit  -  7/26/21  Next Office Visit  -  none    Last Filled  -    Last UDS -    Contract -

## 2021-09-28 ENCOUNTER — OFFICE VISIT (OUTPATIENT)
Dept: FAMILY MEDICINE CLINIC | Age: 41
End: 2021-09-28
Payer: COMMERCIAL

## 2021-09-28 VITALS
OXYGEN SATURATION: 98 % | SYSTOLIC BLOOD PRESSURE: 110 MMHG | DIASTOLIC BLOOD PRESSURE: 68 MMHG | WEIGHT: 200 LBS | HEART RATE: 63 BPM | BODY MASS INDEX: 30.41 KG/M2

## 2021-09-28 DIAGNOSIS — G89.29 CHRONIC MIDLINE LOW BACK PAIN WITHOUT SCIATICA: Primary | ICD-10-CM

## 2021-09-28 DIAGNOSIS — M54.50 CHRONIC MIDLINE LOW BACK PAIN WITHOUT SCIATICA: Primary | ICD-10-CM

## 2021-09-28 PROCEDURE — 99213 OFFICE O/P EST LOW 20 MIN: CPT | Performed by: NURSE PRACTITIONER

## 2021-09-28 RX ORDER — MELOXICAM 15 MG/1
15 TABLET ORAL DAILY
Qty: 30 TABLET | Refills: 3 | Status: SHIPPED | OUTPATIENT
Start: 2021-09-28 | End: 2021-11-01 | Stop reason: SDUPTHER

## 2021-09-28 ASSESSMENT — ENCOUNTER SYMPTOMS
WHEEZING: 0
BACK PAIN: 1
SHORTNESS OF BREATH: 0
GASTROINTESTINAL NEGATIVE: 1

## 2021-09-28 NOTE — PROGRESS NOTES
Patient: Tree Dias is a 37 y.o. male who presents today with the following Chief Complaint(s):  Chief Complaint   Patient presents with    Lower Back Pain     hurts getting up and down, dealing with it for years, x3 months ago started getting worse          HPI   Low back midline- pain worst when going from sitting to standing- pain free mostly when standing- pain has been going on for years- has been worse in the last few months- takes ibuprofen and it has not been helpful recently. Tried chiropractor as well over the last few years- helps for some time but then pain comes back. Wears a support brace that seems to help some- no injury that he is aware. Recent Ct scan of abdomen showed some   Degenerative changes involve the thoracolumbar spine.           Current Outpatient Medications   Medication Sig Dispense Refill    meloxicam (MOBIC) 15 MG tablet Take 1 tablet by mouth daily 30 tablet 3    omeprazole (PRILOSEC) 10 MG capsule Take 10 mg by mouth daily       No current facility-administered medications for this visit. Patient's past medical history, surgical history, family history, medications,  andallergies  were all reviewed and updated as appropriate today. Review of Systems   Respiratory: Negative for shortness of breath and wheezing. Cardiovascular: Negative for chest pain and palpitations. Gastrointestinal: Negative. Genitourinary: Negative. Musculoskeletal: Positive for back pain (no sciatica- back pain midline ). Neurological: Positive for headaches (migraines about once a month). Physical Exam  Vitals and nursing note reviewed. Constitutional:       Appearance: Normal appearance. He is well-developed. HENT:      Head: Normocephalic and atraumatic. Right Ear: External ear normal.      Left Ear: External ear normal.      Nose: Nose normal.      Mouth/Throat:      Pharynx: No oropharyngeal exudate or posterior oropharyngeal erythema.    Eyes: Conjunctiva/sclera: Conjunctivae normal.   Cardiovascular:      Rate and Rhythm: Normal rate and regular rhythm. Heart sounds: Normal heart sounds. No murmur heard. Pulmonary:      Effort: Pulmonary effort is normal. No respiratory distress. Breath sounds: Normal breath sounds. No wheezing or rales. Musculoskeletal:         General: Normal range of motion. Cervical back: Normal range of motion. Lymphadenopathy:      Cervical: No cervical adenopathy. Skin:     General: Skin is warm and dry. Neurological:      General: No focal deficit present. Mental Status: He is alert and oriented to person, place, and time. Deep Tendon Reflexes: Reflexes are normal and symmetric. Psychiatric:         Mood and Affect: Mood normal.         Behavior: Behavior normal.         Thought Content: Thought content normal.         Judgment: Judgment normal.       Vitals:    09/28/21 0738   BP: 110/68   Pulse: 63   SpO2: 98%       Assessment:  Encounter Diagnosis   Name Primary?  Chronic midline low back pain without sciatica Yes       Plan:  1. Chronic midline low back pain without sciatica    - [facility]  - meloxicam (MOBIC) 15 MG tablet; Take 1 tablet by mouth daily  Dispense: 30 tablet; Refill: 3        No follow-ups on file.

## 2021-09-29 ENCOUNTER — TELEPHONE (OUTPATIENT)
Dept: ORTHOPEDIC SURGERY | Age: 41
End: 2021-09-29

## 2021-09-29 NOTE — TELEPHONE ENCOUNTER
Attempted to contact patient twice to get an appointment set up with a back and spine specialist. LVM with the back and spine call center for patient to callback at their convenience. [phone].

## 2021-09-30 ENCOUNTER — OFFICE VISIT (OUTPATIENT)
Dept: ORTHOPEDIC SURGERY | Age: 41
End: 2021-09-30
Payer: COMMERCIAL

## 2021-09-30 VITALS — BODY MASS INDEX: 30.31 KG/M2 | HEIGHT: 68 IN | WEIGHT: 200 LBS

## 2021-09-30 DIAGNOSIS — M47.816 LUMBAR SPONDYLOSIS: ICD-10-CM

## 2021-09-30 DIAGNOSIS — M54.50 ACUTE LOW BACK PAIN WITHOUT SCIATICA, UNSPECIFIED BACK PAIN LATERALITY: Primary | ICD-10-CM

## 2021-09-30 PROCEDURE — 99204 OFFICE O/P NEW MOD 45 MIN: CPT | Performed by: PHYSICIAN ASSISTANT

## 2021-09-30 RX ORDER — METHYLPREDNISOLONE 4 MG/1
TABLET ORAL
Qty: 1 KIT | Refills: 0 | Status: SHIPPED | OUTPATIENT
Start: 2021-09-30 | End: 2022-07-01

## 2021-09-30 NOTE — PROGRESS NOTES
New Patient: LUMBAR SPINE    Referring Provider:  JAGRUTI Luong*    CHIEF COMPLAINT:    Chief Complaint   Patient presents with    Back Pain     lumbar       HISTORY OF PRESENT ILLNESS:       Mr. Yaima Diaz  is a pleasant 37 y.o. male here for consultation regarding his LBP. He states his pain began insidiously about 2 to 3 years ago. Patient states he has had episodes of pain within his back over the past few years with no specific injury. .His pain has steadily continued since this last episode which started about 2 to 3 weeks ago. He rates his back pain 5-9/10 without radiation into either lower extremity. He describes the pain as intermittent to constant depending on position. Pain is worse with rising from a seated position and at times with weightbearing and improved some with standing and walking. He denies numbness and tingling in his right or left leg. He denies weakness of his right or left leg and denies bowel or bladder dysfunction. The pain at times disrupts his sleep.    Pain Assessment  Location of Pain: Back  Severity of Pain: 9  Quality of Pain: Other (Comment)  Duration of Pain: Other (Comment)  Frequency of Pain: Other (Comment)]  Current/Past Treatment:   · Physical Therapy: None  · Chiropractic: On and off over the past few years with some benefit  · Injection: None  · Medications: Mobic recently but he has not started this prescription    Past Medical History:   Past Medical History:   Diagnosis Date    GERD (gastroesophageal reflux disease)         Past Surgical History:     Past Surgical History:   Procedure Laterality Date    COLONOSCOPY  2010    COLONOSCOPY  2/3/16    diverticulosis    EYE SURGERY      orbital floor    PILONIDAL CYST EXCISION N/A 8/12/2020    EXCISION OF PILONIDAL CYST performed by Ulysses Stade, MD at [facility]       Current Medications:     Current Outpatient Medications:     methylPREDNISolone (MEDROL DOSEPACK) 4 MG tablet, Take by mouth., Disp: 1 kit, Rfl: 0    meloxicam (MOBIC) 15 MG tablet, Take 1 tablet by mouth daily, Disp: 30 tablet, Rfl: 3    omeprazole (PRILOSEC) 10 MG capsule, Take 10 mg by mouth daily, Disp: , Rfl:     Allergies:  Penicillins    Social History:    reports that he quit smoking about 3 years ago. His smoking use included cigarettes. He has a 1.00 pack-year smoking history. He has quit using smokeless tobacco.  His smokeless tobacco use included chew. He reports current alcohol use. He reports that he does not use drugs. Family History:   Family History   Problem Relation Age of Onset    High Blood Pressure Mother     Stroke Maternal Grandfather     Diabetes Maternal Grandfather     Diabetes Maternal Grandmother     Diabetes Paternal Grandfather     High Blood Pressure Brother        REVIEW OF SYSTEMS: Full ROS noted & scanned   CONSTITUTIONAL: Denies unexplained weight loss, fevers, chills or fatigue  NEUROLOGICAL: Denies unsteady gait or progressive weakness  MUSCULOSKELETAL: Denies joint swelling or redness  PSYCHOLOGICAL: Denies anxiety, depression   SKIN: Denies skin changes, delayed healing, rash, itching   HEMATOLOGIC: Denies easy bleeding or bruising  ENDOCRINE: Denies excessive thirst, urination, heat/cold  RESPIRATORY: Denies current dyspnea, cough  GI: Denies nausea, vomiting, diarrhea   : Denies bowel or bladder issues      PHYSICAL EXAM:    Vitals: Height 5' 7.99\" (1.727 m), weight 200 lb (90.7 kg). GENERAL EXAM:  · General Apparence: Patient is adequately groomed with no evidence of malnutrition. · Orientation: The patient is oriented to time, place and person. · Mood & Affect:The patient's mood and affect are appropriate. · Vascular: Examination reveals no swelling tenderness in upper or lower extremities. Good capillary refill.   · Lymphatic: The lymphatic examination bilaterally reveals all areas to be without enlargement or induration  · Sensation: Sensation is intact without deficit  · Coordination/Balance: Good coordination. Tandem walking normal.     LUMBAR/SACRAL EXAMINATION:  · Inspection: Local inspection shows no step-off or bruising. Lumbar alignment is normal.  Sagittal and Coronal balance is neutral.      · Palpation:   No evidence of tenderness at the midline. No tenderness bilaterally at the paraspinal or trochanters. There is no step-off or paraspinal spasm. · Range of Motion: Lumbar flexion, extension and rotation are mildly limited due to pain. · Strength:   Strength testing is 5/5 in all muscle groups tested. · Special Tests:   Straight leg raise and crossed SLR negative. Leg length and pelvis level. · Skin: There are no rashes, ulcerations or lesions. · Reflexes: Reflexes are symmetrically 2+ at the patellar and ankle tendons. Clonus absent bilaterally at the feet. · Gait & station: normal, patient ambulates without assistance    · Additional Examinations:   · RIGHT LOWER EXTREMITY: Inspection/examination of the right lower extremity does not show any tenderness, deformity or injury. Range of motion is unremarkable. There is no gross instability. There are no rashes, ulcerations or lesions. Strength and tone are normal.  · LEFT LOWER EXTREMITY:  Inspection/examination of the left lower extremity does not show any tenderness, deformity or injury. Range of motion is unremarkable. There is no gross instability. There are no rashes, ulcerations or lesions. Strength and tone are normal.    Diagnostic Testing:    X-rays: 2 views of the lumbar spine to include AP and lateral were obtained today in the office and reviewed with the patient which shows well-maintained vertebral heights with mild lumbar spondylosis and spina bifida occulta is noted. Impression:   Lumbar spondylosis    1.  Acute low back pain without sciatica, unspecified back pain laterality        Plan:      · We discussed the diagnosis and treatment options including observation, additional oral

## 2021-11-01 DIAGNOSIS — G89.29 CHRONIC MIDLINE LOW BACK PAIN WITHOUT SCIATICA: ICD-10-CM

## 2021-11-01 DIAGNOSIS — M54.50 CHRONIC MIDLINE LOW BACK PAIN WITHOUT SCIATICA: ICD-10-CM

## 2021-11-01 RX ORDER — MELOXICAM 15 MG/1
15 TABLET ORAL DAILY
Qty: 30 TABLET | Refills: 3 | Status: SHIPPED | OUTPATIENT
Start: 2021-11-01 | End: 2022-08-29

## 2022-04-18 ENCOUNTER — PATIENT MESSAGE (OUTPATIENT)
Dept: FAMILY MEDICINE CLINIC | Age: 42
End: 2022-04-18

## 2022-04-18 DIAGNOSIS — K57.32 DIVERTICULITIS OF COLON: ICD-10-CM

## 2022-04-18 RX ORDER — HYDROCODONE BITARTRATE AND ACETAMINOPHEN 5; 325 MG/1; MG/1
1 TABLET ORAL EVERY 6 HOURS PRN
Qty: 12 TABLET | Refills: 0 | Status: SHIPPED | OUTPATIENT
Start: 2022-04-18 | End: 2022-07-01 | Stop reason: SDUPTHER

## 2022-04-18 RX ORDER — CIPROFLOXACIN 500 MG/1
500 TABLET, FILM COATED ORAL 2 TIMES DAILY
Qty: 20 TABLET | Refills: 0 | Status: SHIPPED | OUTPATIENT
Start: 2022-04-18 | End: 2022-07-01 | Stop reason: SDUPTHER

## 2022-04-18 RX ORDER — METRONIDAZOLE 500 MG/1
500 TABLET ORAL 2 TIMES DAILY
Qty: 20 TABLET | Refills: 0 | Status: SHIPPED | OUTPATIENT
Start: 2022-04-18 | End: 2022-07-01 | Stop reason: SDUPTHER

## 2022-04-18 NOTE — TELEPHONE ENCOUNTER
Scheduled patient but not until June due to his work schedule. He understands he may not get refills if he runs out.

## 2022-04-18 NOTE — TELEPHONE ENCOUNTER
Last Office Visit  -  9/28/21  Next Office Visit  -  n/a     Last Filled  -  8/30/21  Last UDS -  n/a  Contract -  n/a

## 2022-04-18 NOTE — TELEPHONE ENCOUNTER
From: Anais Smith  To: Dr. Dillon Vega: 4/18/2022 9:52 AM EDT  Subject: Zander_Luis Enrique diverticulitis    I'm at the beginning of a flare up and was hoping to get a refill on the following:   Ciprofloxacin  metroNidazole  Hydrocodone

## 2022-05-06 ENCOUNTER — OFFICE VISIT (OUTPATIENT)
Dept: SURGERY | Age: 42
End: 2022-05-06
Payer: COMMERCIAL

## 2022-05-06 VITALS — WEIGHT: 207 LBS | SYSTOLIC BLOOD PRESSURE: 108 MMHG | DIASTOLIC BLOOD PRESSURE: 72 MMHG | BODY MASS INDEX: 31.48 KG/M2

## 2022-05-06 DIAGNOSIS — K57.32 SIGMOID DIVERTICULITIS: Primary | ICD-10-CM

## 2022-05-06 PROCEDURE — 99214 OFFICE O/P EST MOD 30 MIN: CPT | Performed by: SURGERY

## 2022-05-06 ASSESSMENT — ENCOUNTER SYMPTOMS
ABDOMINAL DISTENTION: 1
ABDOMINAL PAIN: 1
EYES NEGATIVE: 1
NAUSEA: 1
RESPIRATORY NEGATIVE: 1

## 2022-05-06 NOTE — PROGRESS NOTES
:     Morro Guillaume is a 37 y.o. male     CC:     HPI:       Past Medical History:   Diagnosis Date    GERD (gastroesophageal reflux disease)        Penicillins     Past Surgical History:   Procedure Laterality Date    COLONOSCOPY      COLONOSCOPY  2/3/16    diverticulosis    EYE SURGERY      orbital floor    PILONIDAL CYST EXCISION N/A 2020    EXCISION OF PILONIDAL CYST performed by Yung Benitez MD at [address] [place] [address]        Prior to Visit Medications    Medication Sig Taking? Authorizing Provider   omeprazole (PRILOSEC) 10 MG capsule Take 10 mg by mouth daily Yes Historical Provider, MD   meloxicam (MOBIC) 15 MG tablet Take 1 tablet by mouth daily  JAGRUTI Nina - CNP   methylPREDNISolone (MEDROL DOSEPACK) 4 MG tablet Take by mouth. Shantelle Barrett PA-C       Social History     Socioeconomic History    Marital status:      Spouse name: Not on file    Number of children: Not on file    Years of education: Not on file    Highest education level: Not on file   Occupational History    Occupation: engineering, mechanical   Tobacco Use    Smoking status: Former Smoker     Packs/day: 0.25     Years: 4.00     Pack years: 1.00     Types: Cigarettes     Quit date: 2018     Years since quittin.3    Smokeless tobacco: Former User     Types: Chew    Tobacco comment: socially   Vaping Use    Vaping Use: Never used   Substance and Sexual Activity    Alcohol use:  Yes     Alcohol/week: 0.0 standard drinks     Comment: socially    Drug use: No    Sexual activity: Yes     Partners: Female   Other Topics Concern    Not on file   Social History Narrative    Not on file     Social Determinants of Health     Financial Resource Strain:     Difficulty of Paying Living Expenses: Not on file   Food Insecurity:     Worried About Running Out of Food in the Last Year: Not on file    Will of Food in the Last Year: Not on file   Transportation Needs:     Lack of Transportation (Medical): Not on file    Lack of Transportation (Non-Medical): Not on file   Physical Activity:     Days of Exercise per Week: Not on file    Minutes of Exercise per Session: Not on file   Stress:     Feeling of Stress : Not on file   Social Connections:     Frequency of Communication with Friends and Family: Not on file    Frequency of Social Gatherings with Friends and Family: Not on file    Attends Hindu Services: Not on file    Active Member of [address] [place] [address] or Organizations: Not on file    Attends Club or Organization Meetings: Not on file    Marital Status: Not on file   Intimate Partner Violence:     Fear of Current or Ex-Partner: Not on file    Emotionally Abused: Not on file    Physically Abused: Not on file    Sexually Abused: Not on file   Housing Stability:     Unable to Pay for Housing in the Last Year: Not on file    Number of [place] in the Last Year: Not on file    Unstable Housing in the Last Year: Not on file       Review of Systems   Constitutional: Positive for activity change and appetite change. HENT: Negative. Eyes: Negative. Respiratory: Negative. Cardiovascular: Negative. Gastrointestinal: Positive for abdominal distention, abdominal pain and nausea. Endocrine: Negative. Genitourinary: Negative. Musculoskeletal: Negative. Skin: Negative. Neurological: Negative. Hematological: Negative.     Psychiatric/Behavioral: Negative.        :   Physical Exam  /72   Wt 207 lb (93.9 kg)   BMI 31.48 kg/m²     :            Plan:

## 2022-05-06 NOTE — LETTER
[address] [address]  Phone: [phone]  Fax: [phone]    May 6, 2022    Patient: Cali Cavazos  MRN:  [id number]  YOB: 1980  Date of Visit: 5/6/2022    Dear Dr Yared Azul: Thank you for the request for consultation for [facility]. Below are the relevant portions of my assessment and plan of care. Assessment:  81-year-old male who presents for evaluation of lower abdominal pain. He has had 4 episodes of diverticulitis dating back to July 2021 and is currently finishing a course of oral antibiotics for his most recent bout. On physical examination, he has mild left lower quadrant abdominal tenderness. He has had recurrent bouts of diverticulitis since July of last year despite taking fiber supplements. He is up-to-date on colon cancer screening. Plan:  Follow-up in 1 month. We are tentatively planning for a robotic laparoscopic sigmoid colon resection in about 6 weeks from now. If you have questions, please do not hesitate to call me. I look forward to following Luis Enrique along with you.     Sincerely,    Maureen Mckinnon MD    CC providers:    Sarahi Fajardo MD  [facility] [address]  [facility] [facility] [address]

## 2022-05-06 NOTE — PROGRESS NOTES
Subjective:      Chief complaint lower abdominal pain    Patient ID: Hayley Cage is a 37 y.o. male seen in follow-up for recurrent diverticulitis    HPI    Review of Systems    Objective:   Physical Exam  Constitutional:       Appearance: He is well-developed. HENT:      Head: Normocephalic and atraumatic. Right Ear: External ear normal.      Left Ear: External ear normal.   Eyes:      Conjunctiva/sclera: Conjunctivae normal.   Cardiovascular:      Rate and Rhythm: Normal rate and regular rhythm. Pulmonary:      Effort: Pulmonary effort is normal.      Breath sounds: Normal breath sounds. Abdominal:      General: There is no distension. Palpations: Abdomen is soft. Tenderness: There is abdominal tenderness. Musculoskeletal:         General: Normal range of motion. Cervical back: Normal range of motion and neck supple. Skin:     General: Skin is warm and dry. Neurological:      Mental Status: He is alert and oriented to person, place, and time. Psychiatric:         Behavior: Behavior normal.         Assessment:      62-year-old male who presents for evaluation of lower abdominal pain. He has had 4 episodes of diverticulitis dating back to July 2021 and is currently finishing a course of oral antibiotics for his most recent bout. On physical examination, he has mild left lower quadrant abdominal tenderness. He has had recurrent bouts of diverticulitis since July of last year despite taking fiber supplements. He is up-to-date on colon cancer screening. Plan:      Follow-up in 1 month. We are tentatively planning for a robotic laparoscopic sigmoid colon resection in about 6 weeks from now.         Wilmer Montenegro MD

## 2022-06-10 ENCOUNTER — OFFICE VISIT (OUTPATIENT)
Dept: SURGERY | Age: 42
End: 2022-06-10
Payer: COMMERCIAL

## 2022-06-10 VITALS — WEIGHT: 201 LBS | BODY MASS INDEX: 30.57 KG/M2 | DIASTOLIC BLOOD PRESSURE: 64 MMHG | SYSTOLIC BLOOD PRESSURE: 121 MMHG

## 2022-06-10 DIAGNOSIS — K57.32 SIGMOID DIVERTICULITIS: Primary | ICD-10-CM

## 2022-06-10 PROCEDURE — 99213 OFFICE O/P EST LOW 20 MIN: CPT | Performed by: SURGERY

## 2022-06-10 NOTE — PROGRESS NOTES
Subjective:      Chief complaintdiverticulitis    Patient ID: Cristina Cooper is a 40 y.o. male in in follow-up for acute sigmoid diverticulitis    HPI    Review of Systems    Objective:   Physical Exam  Constitutional:       Appearance: He is well-developed. HENT:      Head: Normocephalic and atraumatic. Right Ear: External ear normal.      Left Ear: External ear normal.   Eyes:      Conjunctiva/sclera: Conjunctivae normal.   Cardiovascular:      Rate and Rhythm: Normal rate and regular rhythm. Pulmonary:      Effort: Pulmonary effort is normal. No respiratory distress. Abdominal:      General: There is no distension. Palpations: Abdomen is soft. Tenderness: There is no abdominal tenderness. Musculoskeletal:         General: Normal range of motion. Cervical back: Normal range of motion and neck supple. Skin:     General: Skin is warm and dry. Neurological:      Mental Status: He is alert and oriented to person, place, and time. Psychiatric:         Behavior: Behavior normal.         Assessment:      50-year-old male seen in follow-up for recurrent sigmoid diverticulitis. He is currently doing well and is asymptomatic. Because of the recurrent nature of the disease, we are planning for elective sigmoid colon resection in August of this year. Plan:      Robotic laparoscopic sigmoid colon resection with coloproctostomy.         Ulysses Stade, MD

## 2022-07-01 ENCOUNTER — TELEMEDICINE (OUTPATIENT)
Dept: FAMILY MEDICINE CLINIC | Age: 42
End: 2022-07-01
Payer: COMMERCIAL

## 2022-07-01 DIAGNOSIS — K57.32 DIVERTICULITIS OF COLON: ICD-10-CM

## 2022-07-01 PROCEDURE — 99213 OFFICE O/P EST LOW 20 MIN: CPT | Performed by: FAMILY MEDICINE

## 2022-07-01 RX ORDER — METRONIDAZOLE 500 MG/1
500 TABLET ORAL 2 TIMES DAILY
Qty: 20 TABLET | Refills: 0 | Status: SHIPPED | OUTPATIENT
Start: 2022-07-01 | End: 2022-08-08 | Stop reason: SDUPTHER

## 2022-07-01 RX ORDER — HYDROCODONE BITARTRATE AND ACETAMINOPHEN 5; 325 MG/1; MG/1
1 TABLET ORAL EVERY 6 HOURS PRN
Qty: 12 TABLET | Refills: 0 | Status: SHIPPED | OUTPATIENT
Start: 2022-07-01 | End: 2022-08-08 | Stop reason: SDUPTHER

## 2022-07-01 RX ORDER — CIPROFLOXACIN 500 MG/1
500 TABLET, FILM COATED ORAL 2 TIMES DAILY
Qty: 20 TABLET | Refills: 0 | Status: SHIPPED | OUTPATIENT
Start: 2022-07-01 | End: 2022-08-08 | Stop reason: SDUPTHER

## 2022-07-01 ASSESSMENT — PATIENT HEALTH QUESTIONNAIRE - PHQ9
1. LITTLE INTEREST OR PLEASURE IN DOING THINGS: 0
SUM OF ALL RESPONSES TO PHQ QUESTIONS 1-9: 0
SUM OF ALL RESPONSES TO PHQ QUESTIONS 1-9: 0
SUM OF ALL RESPONSES TO PHQ9 QUESTIONS 1 & 2: 0
SUM OF ALL RESPONSES TO PHQ QUESTIONS 1-9: 0
2. FEELING DOWN, DEPRESSED OR HOPELESS: 0
SUM OF ALL RESPONSES TO PHQ QUESTIONS 1-9: 0

## 2022-07-01 NOTE — PROGRESS NOTES
Luis Enrique Smith (:  1980) is a Established patient, here for evaluation of the following:    Assessment & Plan   Below is the assessment and plan developed based on review of pertinent history, physical exam, labs, studies, and medications. 1. Diverticulitis of colon  -     HYDROcodone-acetaminophen (NORCO) 5-325 MG per tablet; Take 1 tablet by mouth every 6 hours as needed for Pain for up to 3 days. Intended supply: 3 days. Take lowest dose possible to manage pain, Disp-12 tablet, R-0Normal  -     ciprofloxacin (CIPRO) 500 MG tablet; Take 1 tablet by mouth 2 times daily for 10 days, Disp-20 tablet, R-0Normal  -     metroNIDAZOLE (FLAGYL) 500 MG tablet; Take 1 tablet by mouth 2 times daily for 10 days, Disp-20 tablet, R-0Normal    No follow-ups on file. Nacho Mccormack is a 40 y.o. male. Patient presents with:  Medication Check: diverticulosis flare       Patient presents for diverticulitis flare. Notes taht this started 2 days ago and is worsening. Has noted llq pain and pain with bowel moemvents. Has had this in the past.  The patients PMH, surgical history, family history, medications, allergies were all reviewed and updated as appropriate today.       Review of Systems       Objective   Patient-Reported Vitals  No data recorded     Physical Exam  [INSTRUCTIONS:  \"[x]\" Indicates a positive item  \"[]\" Indicates a negative item  -- DELETE ALL ITEMS NOT EXAMINED]    Constitutional: [x] Appears well-developed and well-nourished [x] No apparent distress      [] Abnormal -     Mental status: [x] Alert and awake  [x] Oriented to person/place/time [x] Able to follow commands    [] Abnormal -     Eyes:   EOM    [x]  Normal    [] Abnormal -   Sclera  [x]  Normal    [] Abnormal -          Discharge [x]  None visible   [] Abnormal -     HENT: [x] Normocephalic, atraumatic  [] Abnormal -   [x] Mouth/Throat: Mucous membranes are moist    External Ears [x] Normal  [] Abnormal -    Neck: [x] No visualized mass [] Abnormal -     Pulmonary/Chest: [x] Respiratory effort normal   [x] No visualized signs of difficulty breathing or respiratory distress        [] Abnormal -      Musculoskeletal:   [x] Normal gait with no signs of ataxia         [x] Normal range of motion of neck        [] Abnormal -     Neurological:        [x] No Facial Asymmetry (Cranial nerve 7 motor function) (limited exam due to video visit)          [x] No gaze palsy        [] Abnormal -          Skin:        [x] No significant exanthematous lesions or discoloration noted on facial skin         [] Abnormal -            Psychiatric:       [x] Normal Affect [] Abnormal -        [x] No Hallucinations    Other pertinent observable physical exam findings:-             On this date 7/1/2022 I have spent 20 minutes reviewing previous notes, test results and face to face (virtual) with the patient discussing the diagnosis and importance of compliance with the treatment plan as well as documenting on the day of the visit. Luis Enrique Smith, was evaluated through a synchronous (real-time) audio-video encounter. The patient (or guardian if applicable) is aware that this is a billable service, which includes applicable co-pays. This Virtual Visit was conducted with patient's (and/or legal guardian's) consent. The visit was conducted pursuant to the emergency declaration under the [address] [place] [address] authority and the [employer] General Act. Patient identification was verified, and a caregiver was present when appropriate. The patient was located at Home: [phone]  [phone].    [facility] Luanne Griffith MD

## 2022-08-08 ENCOUNTER — PATIENT MESSAGE (OUTPATIENT)
Dept: FAMILY MEDICINE CLINIC | Age: 42
End: 2022-08-08

## 2022-08-08 DIAGNOSIS — K57.32 DIVERTICULITIS OF COLON: ICD-10-CM

## 2022-08-08 RX ORDER — METRONIDAZOLE 500 MG/1
500 TABLET ORAL 2 TIMES DAILY
Qty: 20 TABLET | Refills: 0 | Status: SHIPPED | OUTPATIENT
Start: 2022-08-08 | End: 2022-09-02 | Stop reason: SDUPTHER

## 2022-08-08 RX ORDER — CIPROFLOXACIN 500 MG/1
500 TABLET, FILM COATED ORAL 2 TIMES DAILY
Qty: 20 TABLET | Refills: 0 | Status: SHIPPED | OUTPATIENT
Start: 2022-08-08 | End: 2022-09-02 | Stop reason: SDUPTHER

## 2022-08-08 RX ORDER — HYDROCODONE BITARTRATE AND ACETAMINOPHEN 5; 325 MG/1; MG/1
1 TABLET ORAL EVERY 6 HOURS PRN
Qty: 12 TABLET | Refills: 0 | Status: SHIPPED | OUTPATIENT
Start: 2022-08-08 | End: 2022-09-02 | Stop reason: SDUPTHER

## 2022-08-08 NOTE — TELEPHONE ENCOUNTER
Last Office Visit  -  7/1/22  Next Office Visit  -      Last Filled  -  7/1/22  Last UDS -    Contract -

## 2022-08-29 NOTE — PROGRESS NOTES
Name_______________________________________Printed:____________________  Date and time of surgery___9/16/22 @ 1130_____________________Arrival Time:__[facility] hosp______________   1. The instructions given regarding when and if a patient needs to stop oral intake prior to surgery varies. Follow the specific instructions you were given                  __x_Nothing to eat or to drink after Midnight the night before.                   ____Carbo loading or ERAS instructions will be given to select patients-if you have been given those instructions -please do the following                           The evening before your surgery after dinner before midnight drink 40 ounces of gatorade. If you are diabetic use sugar free. The morning of surgery drink 40 ounces of water. This needs to be finished 3 hours prior to your surgery start time. 2. Take the following pills with a small sip of water on the morning of surgery____omeprazole_______________________________________________                  Do not take blood pressure medications ending in pril or sartan the gail prior to surgery or the morning of surgery_   3. Aspirin, Ibuprofen, Advil, Naproxen, Vitamin E and other Anti-inflammatory products and supplements should be stopped for 5 -7days before surgery or as directed by your physician. 4. Check with your Doctor regarding stopping Plavix, Coumadin,Eliquis, Lovenox,Effient,Pradaxa,Xarelto, Fragmin or other blood thinners and follow their instructions. 5. Do not smoke, and do not drink any alcoholic beverages 24 hours prior to surgery. This includes NA Beer. Refrain from the usage of any recreational drugs. 6. You may brush your teeth and gargle the morning of surgery. DO NOT SWALLOW WATER   7. You MUST make arrangements for a responsible adult to stay on site while you are here and take you home after your surgery. You will not be allowed to leave alone or drive yourself home.   It is strongly suggested someone stay with you the first 24 hrs. Your surgery will be cancelled if you do not have a ride home. 8. A parent/legal guardian must accompany a child scheduled for surgery and plan to stay at the hospital until the child is discharged. Please do not bring other children with you. 9. Please wear simple, loose fitting clothing to the hospital.  Sharon Gomez not bring valuables (money, credit cards, checkbooks, etc.) Do not wear any makeup (including no eye makeup) or nail polish on your fingers or toes. 10. DO NOT wear any jewelry or piercings on day of surgery. All body piercing jewelry must be removed. 11. If you have ___dentures, they will be removed before going to the OR; we will provide you a container. If you wear ___contact lenses or ___glasses, they will be removed; please bring a case for them. 12. Please see your family doctor/pediatrician for a history & physical and/or concerning medications. Bring any test results/reports from your physician's office. PCP___fitz_______________Phone___________H&P Appt. Date________             13 If you  have a Living Will and Durable Power of  for Healthcare, please bring in a copy. 15. Notify your Surgeon if you develop any illness between now and surgery  time, cough, cold, fever, sore throat, nausea, vomiting, etc.  Please notify your surgeon if you experience dizziness, shortness of breath or blurred vision between now & the time of your surgery             15. DO NOT shave your operative site 96 hours prior to surgery. For face & neck surgery, men may use an electric razor 48 hours prior to surgery. 16. Shower the night before or morning of surgery using an antibacterial soap or as you have been instructed. 17. To provide excellent care visitors will be limited to one in the room at any given time. 18.  Please bring picture ID and insurance card.              19.  Visit our web site for additional information:  [email or web address]              20.During flu season no children under the age of 15 are permitted in the hospital for the safety of all patients. 21. If you take a long acting insulin in the evening only  take half of your usual  dose the night  before your procedure              22. If you use a c-pap please bring DOS if staying overnight,             23.For your convenience [facility] has a pharmacy on site to fill your prescriptions. 24. If you use oxygen and have a portable tank please bring it  with you the DOS             25. Bring a complete list of all your medications with name and dose include any supplements. 26. Other__________________________________________   *Please call pre admission testing if you any further questions   [place] [phone]. [place]  [phone]   [address] [place] [address]       VISITOR POLICY(subject to change)    Current policy is 2 visitors per patient. No children. A mask is required. Visiting hours are 8a-8p. Overnight visitors will be at the discretion of the nurse. All above information reviewed with patient in person or by phone. Patient verbalizes understanding. All questions and concerns addressed.                                                                                                  Patient/Rep_____pt_______________                                                                                                                                    PRE OP INSTRUCTIONS

## 2022-09-02 RX ORDER — METRONIDAZOLE 500 MG/1
500 TABLET ORAL 2 TIMES DAILY
Qty: 20 TABLET | Refills: 0 | Status: SHIPPED | OUTPATIENT
Start: 2022-09-02 | End: 2022-09-12

## 2022-09-02 RX ORDER — CIPROFLOXACIN 500 MG/1
500 TABLET, FILM COATED ORAL 2 TIMES DAILY
Qty: 20 TABLET | Refills: 0 | OUTPATIENT
Start: 2022-09-02 | End: 2022-09-12

## 2022-09-02 RX ORDER — HYDROCODONE BITARTRATE AND ACETAMINOPHEN 5; 325 MG/1; MG/1
1 TABLET ORAL EVERY 6 HOURS PRN
Qty: 12 TABLET | Refills: 0 | OUTPATIENT
Start: 2022-09-02 | End: 2022-09-05

## 2022-09-02 RX ORDER — HYDROCODONE BITARTRATE AND ACETAMINOPHEN 5; 325 MG/1; MG/1
1 TABLET ORAL EVERY 8 HOURS PRN
Qty: 9 TABLET | Refills: 0 | Status: SHIPPED | OUTPATIENT
Start: 2022-09-02 | End: 2022-09-05

## 2022-09-02 RX ORDER — METRONIDAZOLE 500 MG/1
500 TABLET ORAL 2 TIMES DAILY
Qty: 20 TABLET | Refills: 0 | OUTPATIENT
Start: 2022-09-02 | End: 2022-09-12

## 2022-09-02 RX ORDER — CIPROFLOXACIN 500 MG/1
500 TABLET, FILM COATED ORAL 2 TIMES DAILY
Qty: 20 TABLET | Refills: 0 | Status: SHIPPED | OUTPATIENT
Start: 2022-09-02 | End: 2022-09-12

## 2022-09-02 NOTE — TELEPHONE ENCOUNTER
I think he needs to be evaluated. He likely needs a CT scan to further evaluate. I recommend the ER to further evaluate.

## 2022-09-08 ENCOUNTER — TELEPHONE (OUTPATIENT)
Dept: SURGERY | Age: 42
End: 2022-09-08

## 2022-09-08 NOTE — TELEPHONE ENCOUNTER
Pt is having a flare up that started 9/1 and started an antibiotic the same day. He stated Dr. Radha Roy told him to call the office if he has a flare up before surgery.  Please call [phone]

## 2022-09-16 ENCOUNTER — ANESTHESIA EVENT (OUTPATIENT)
Dept: OPERATING ROOM | Age: 42
DRG: 331 | End: 2022-09-16
Payer: COMMERCIAL

## 2022-09-16 ENCOUNTER — ANESTHESIA (OUTPATIENT)
Dept: OPERATING ROOM | Age: 42
DRG: 331 | End: 2022-09-16
Payer: COMMERCIAL

## 2022-09-16 ENCOUNTER — HOSPITAL ENCOUNTER (INPATIENT)
Age: 42
LOS: 3 days | Discharge: HOME | DRG: 331 | End: 2022-09-19
Attending: SURGERY | Admitting: SURGERY
Payer: COMMERCIAL

## 2022-09-16 DIAGNOSIS — K57.32 SIGMOID DIVERTICULITIS: ICD-10-CM

## 2022-09-16 LAB
ABO/RH: NORMAL
ANION GAP SERPL CALCULATED.3IONS-SCNC: 11 MMOL/L (ref 3–16)
ANTIBODY SCREEN: NORMAL
BUN BLDV-MCNC: 15 MG/DL (ref 7–20)
CALCIUM SERPL-MCNC: 9.9 MG/DL (ref 8.3–10.6)
CHLORIDE BLD-SCNC: 106 MMOL/L (ref 99–110)
CO2: 24 MMOL/L (ref 21–32)
CREAT SERPL-MCNC: 0.8 MG/DL (ref 0.9–1.3)
GFR AFRICAN AMERICAN: >60
GFR NON-AFRICAN AMERICAN: >60
GLUCOSE BLD-MCNC: 95 MG/DL (ref 70–99)
HCT VFR BLD CALC: 46.2 % (ref 40.5–52.5)
HEMOGLOBIN: 15.7 G/DL (ref 13.5–17.5)
MCH RBC QN AUTO: 30.8 PG (ref 26–34)
MCHC RBC AUTO-ENTMCNC: 33.8 G/DL (ref 31–36)
MCV RBC AUTO: 91.1 FL (ref 80–100)
PDW BLD-RTO: 13.1 % (ref 12.4–15.4)
PLATELET # BLD: 246 K/UL (ref 135–450)
PMV BLD AUTO: 7.4 FL (ref 5–10.5)
POTASSIUM SERPL-SCNC: 4.6 MMOL/L (ref 3.5–5.1)
RBC # BLD: 5.08 M/UL (ref 4.2–5.9)
SODIUM BLD-SCNC: 141 MMOL/L (ref 136–145)
WBC # BLD: 7.1 K/UL (ref 4–11)

## 2022-09-16 PROCEDURE — 88307 TISSUE EXAM BY PATHOLOGIST: CPT

## 2022-09-16 PROCEDURE — 3600000019 HC SURGERY ROBOT ADDTL 15MIN: Performed by: SURGERY

## 2022-09-16 PROCEDURE — 86900 BLOOD TYPING SEROLOGIC ABO: CPT

## 2022-09-16 PROCEDURE — C9113 INJ PANTOPRAZOLE SODIUM, VIA: HCPCS | Performed by: SURGERY

## 2022-09-16 PROCEDURE — 2500000003 HC RX 250 WO HCPCS: Performed by: REGISTERED NURSE

## 2022-09-16 PROCEDURE — 6360000002 HC RX W HCPCS: Performed by: REGISTERED NURSE

## 2022-09-16 PROCEDURE — 2720000010 HC SURG SUPPLY STERILE: Performed by: SURGERY

## 2022-09-16 PROCEDURE — 2580000003 HC RX 258: Performed by: SURGERY

## 2022-09-16 PROCEDURE — 86901 BLOOD TYPING SEROLOGIC RH(D): CPT

## 2022-09-16 PROCEDURE — 1200000000 HC SEMI PRIVATE

## 2022-09-16 PROCEDURE — 7100000000 HC PACU RECOVERY - FIRST 15 MIN: Performed by: SURGERY

## 2022-09-16 PROCEDURE — 2500000003 HC RX 250 WO HCPCS: Performed by: SURGERY

## 2022-09-16 PROCEDURE — 0DTN4ZZ RESECTION OF SIGMOID COLON, PERCUTANEOUS ENDOSCOPIC APPROACH: ICD-10-PCS | Performed by: SURGERY

## 2022-09-16 PROCEDURE — 3700000001 HC ADD 15 MINUTES (ANESTHESIA): Performed by: SURGERY

## 2022-09-16 PROCEDURE — 3600000009 HC SURGERY ROBOT BASE: Performed by: SURGERY

## 2022-09-16 PROCEDURE — 86850 RBC ANTIBODY SCREEN: CPT

## 2022-09-16 PROCEDURE — 2580000003 HC RX 258: Performed by: REGISTERED NURSE

## 2022-09-16 PROCEDURE — 2709999900 HC NON-CHARGEABLE SUPPLY: Performed by: SURGERY

## 2022-09-16 PROCEDURE — 44207 L COLECTOMY/COLOPROCTOSTOMY: CPT | Performed by: SURGERY

## 2022-09-16 PROCEDURE — 85027 COMPLETE CBC AUTOMATED: CPT

## 2022-09-16 PROCEDURE — 88304 TISSUE EXAM BY PATHOLOGIST: CPT

## 2022-09-16 PROCEDURE — 7100000001 HC PACU RECOVERY - ADDTL 15 MIN: Performed by: SURGERY

## 2022-09-16 PROCEDURE — 94150 VITAL CAPACITY TEST: CPT

## 2022-09-16 PROCEDURE — 3700000000 HC ANESTHESIA ATTENDED CARE: Performed by: SURGERY

## 2022-09-16 PROCEDURE — A4217 STERILE WATER/SALINE, 500 ML: HCPCS | Performed by: SURGERY

## 2022-09-16 PROCEDURE — 80048 BASIC METABOLIC PNL TOTAL CA: CPT

## 2022-09-16 PROCEDURE — 8E0W4CZ ROBOTIC ASSISTED PROCEDURE OF TRUNK REGION, PERCUTANEOUS ENDOSCOPIC APPROACH: ICD-10-PCS | Performed by: SURGERY

## 2022-09-16 PROCEDURE — S2900 ROBOTIC SURGICAL SYSTEM: HCPCS | Performed by: SURGERY

## 2022-09-16 PROCEDURE — 36415 COLL VENOUS BLD VENIPUNCTURE: CPT

## 2022-09-16 PROCEDURE — 0D1N4ZP BYPASS SIGMOID COLON TO RECTUM, PERCUTANEOUS ENDOSCOPIC APPROACH: ICD-10-PCS | Performed by: SURGERY

## 2022-09-16 PROCEDURE — 6360000002 HC RX W HCPCS: Performed by: SURGERY

## 2022-09-16 PROCEDURE — 6360000002 HC RX W HCPCS: Performed by: ANESTHESIOLOGY

## 2022-09-16 RX ORDER — PROPOFOL 10 MG/ML
INJECTION, EMULSION INTRAVENOUS PRN
Status: DISCONTINUED | OUTPATIENT
Start: 2022-09-16 | End: 2022-09-16 | Stop reason: SDUPTHER

## 2022-09-16 RX ORDER — SODIUM CHLORIDE, SODIUM LACTATE, POTASSIUM CHLORIDE, CALCIUM CHLORIDE 600; 310; 30; 20 MG/100ML; MG/100ML; MG/100ML; MG/100ML
INJECTION, SOLUTION INTRAVENOUS CONTINUOUS PRN
Status: COMPLETED | OUTPATIENT
Start: 2022-09-16 | End: 2022-09-16

## 2022-09-16 RX ORDER — PROCHLORPERAZINE EDISYLATE 5 MG/ML
5 INJECTION INTRAMUSCULAR; INTRAVENOUS
Status: DISCONTINUED | OUTPATIENT
Start: 2022-09-16 | End: 2022-09-16 | Stop reason: HOSPADM

## 2022-09-16 RX ORDER — BUPIVACAINE HYDROCHLORIDE 5 MG/ML
INJECTION, SOLUTION EPIDURAL; INTRACAUDAL
Status: COMPLETED | OUTPATIENT
Start: 2022-09-16 | End: 2022-09-16

## 2022-09-16 RX ORDER — HYDROMORPHONE HYDROCHLORIDE 1 MG/ML
1 INJECTION, SOLUTION INTRAMUSCULAR; INTRAVENOUS; SUBCUTANEOUS
Status: DISCONTINUED | OUTPATIENT
Start: 2022-09-16 | End: 2022-09-18

## 2022-09-16 RX ORDER — MAGNESIUM SULFATE HEPTAHYDRATE 500 MG/ML
INJECTION, SOLUTION INTRAMUSCULAR; INTRAVENOUS PRN
Status: DISCONTINUED | OUTPATIENT
Start: 2022-09-16 | End: 2022-09-16 | Stop reason: SDUPTHER

## 2022-09-16 RX ORDER — FENTANYL CITRATE 50 UG/ML
25 INJECTION, SOLUTION INTRAMUSCULAR; INTRAVENOUS EVERY 5 MIN PRN
Status: DISCONTINUED | OUTPATIENT
Start: 2022-09-16 | End: 2022-09-16 | Stop reason: HOSPADM

## 2022-09-16 RX ORDER — KETOROLAC TROMETHAMINE 30 MG/ML
INJECTION, SOLUTION INTRAMUSCULAR; INTRAVENOUS PRN
Status: DISCONTINUED | OUTPATIENT
Start: 2022-09-16 | End: 2022-09-16 | Stop reason: SDUPTHER

## 2022-09-16 RX ORDER — ENOXAPARIN SODIUM 100 MG/ML
40 INJECTION SUBCUTANEOUS DAILY
Status: DISCONTINUED | OUTPATIENT
Start: 2022-09-17 | End: 2022-09-19 | Stop reason: HOSPADM

## 2022-09-16 RX ORDER — OXYCODONE HYDROCHLORIDE 5 MG/1
5 TABLET ORAL
Status: DISCONTINUED | OUTPATIENT
Start: 2022-09-16 | End: 2022-09-16 | Stop reason: HOSPADM

## 2022-09-16 RX ORDER — ONDANSETRON 2 MG/ML
4 INJECTION INTRAMUSCULAR; INTRAVENOUS EVERY 6 HOURS PRN
Status: DISCONTINUED | OUTPATIENT
Start: 2022-09-16 | End: 2022-09-19 | Stop reason: HOSPADM

## 2022-09-16 RX ORDER — HYDRALAZINE HYDROCHLORIDE 20 MG/ML
10 INJECTION INTRAMUSCULAR; INTRAVENOUS
Status: DISCONTINUED | OUTPATIENT
Start: 2022-09-16 | End: 2022-09-16 | Stop reason: HOSPADM

## 2022-09-16 RX ORDER — ROCURONIUM BROMIDE 10 MG/ML
INJECTION, SOLUTION INTRAVENOUS PRN
Status: DISCONTINUED | OUTPATIENT
Start: 2022-09-16 | End: 2022-09-16 | Stop reason: SDUPTHER

## 2022-09-16 RX ORDER — METRONIDAZOLE 500 MG/100ML
500 INJECTION, SOLUTION INTRAVENOUS ONCE
Status: COMPLETED | OUTPATIENT
Start: 2022-09-16 | End: 2022-09-16

## 2022-09-16 RX ORDER — LABETALOL HYDROCHLORIDE 5 MG/ML
10 INJECTION, SOLUTION INTRAVENOUS
Status: DISCONTINUED | OUTPATIENT
Start: 2022-09-16 | End: 2022-09-16 | Stop reason: HOSPADM

## 2022-09-16 RX ORDER — MAGNESIUM HYDROXIDE 1200 MG/15ML
LIQUID ORAL CONTINUOUS PRN
Status: COMPLETED | OUTPATIENT
Start: 2022-09-16 | End: 2022-09-16

## 2022-09-16 RX ORDER — SODIUM CHLORIDE 9 MG/ML
INJECTION, SOLUTION INTRAVENOUS PRN
Status: DISCONTINUED | OUTPATIENT
Start: 2022-09-16 | End: 2022-09-19 | Stop reason: HOSPADM

## 2022-09-16 RX ORDER — LIDOCAINE HYDROCHLORIDE 20 MG/ML
INJECTION, SOLUTION EPIDURAL; INFILTRATION; INTRACAUDAL; PERINEURAL PRN
Status: DISCONTINUED | OUTPATIENT
Start: 2022-09-16 | End: 2022-09-16 | Stop reason: SDUPTHER

## 2022-09-16 RX ORDER — DEXAMETHASONE SODIUM PHOSPHATE 4 MG/ML
INJECTION, SOLUTION INTRA-ARTICULAR; INTRALESIONAL; INTRAMUSCULAR; INTRAVENOUS; SOFT TISSUE PRN
Status: DISCONTINUED | OUTPATIENT
Start: 2022-09-16 | End: 2022-09-16 | Stop reason: SDUPTHER

## 2022-09-16 RX ORDER — HYDROMORPHONE HCL 110MG/55ML
PATIENT CONTROLLED ANALGESIA SYRINGE INTRAVENOUS PRN
Status: DISCONTINUED | OUTPATIENT
Start: 2022-09-16 | End: 2022-09-16 | Stop reason: SDUPTHER

## 2022-09-16 RX ORDER — HYDROMORPHONE HCL 110MG/55ML
0.5 PATIENT CONTROLLED ANALGESIA SYRINGE INTRAVENOUS EVERY 5 MIN PRN
Status: COMPLETED | OUTPATIENT
Start: 2022-09-16 | End: 2022-09-16

## 2022-09-16 RX ORDER — SODIUM CHLORIDE, SODIUM LACTATE, POTASSIUM CHLORIDE, CALCIUM CHLORIDE 600; 310; 30; 20 MG/100ML; MG/100ML; MG/100ML; MG/100ML
INJECTION, SOLUTION INTRAVENOUS CONTINUOUS PRN
Status: DISCONTINUED | OUTPATIENT
Start: 2022-09-16 | End: 2022-09-16 | Stop reason: SDUPTHER

## 2022-09-16 RX ORDER — ONDANSETRON 2 MG/ML
4 INJECTION INTRAMUSCULAR; INTRAVENOUS
Status: DISCONTINUED | OUTPATIENT
Start: 2022-09-16 | End: 2022-09-16 | Stop reason: HOSPADM

## 2022-09-16 RX ORDER — FENTANYL CITRATE 50 UG/ML
INJECTION, SOLUTION INTRAMUSCULAR; INTRAVENOUS PRN
Status: DISCONTINUED | OUTPATIENT
Start: 2022-09-16 | End: 2022-09-16 | Stop reason: SDUPTHER

## 2022-09-16 RX ORDER — MIDAZOLAM HYDROCHLORIDE 1 MG/ML
INJECTION INTRAMUSCULAR; INTRAVENOUS PRN
Status: DISCONTINUED | OUTPATIENT
Start: 2022-09-16 | End: 2022-09-16 | Stop reason: SDUPTHER

## 2022-09-16 RX ORDER — PANTOPRAZOLE SODIUM 40 MG/10ML
40 INJECTION, POWDER, LYOPHILIZED, FOR SOLUTION INTRAVENOUS DAILY
Status: DISCONTINUED | OUTPATIENT
Start: 2022-09-16 | End: 2022-09-19 | Stop reason: HOSPADM

## 2022-09-16 RX ORDER — SODIUM CHLORIDE 0.9 % (FLUSH) 0.9 %
5-40 SYRINGE (ML) INJECTION EVERY 12 HOURS SCHEDULED
Status: DISCONTINUED | OUTPATIENT
Start: 2022-09-16 | End: 2022-09-19 | Stop reason: HOSPADM

## 2022-09-16 RX ORDER — SUCCINYLCHOLINE/SOD CL,ISO/PF 200MG/10ML
SYRINGE (ML) INTRAVENOUS PRN
Status: DISCONTINUED | OUTPATIENT
Start: 2022-09-16 | End: 2022-09-16 | Stop reason: SDUPTHER

## 2022-09-16 RX ORDER — ONDANSETRON 2 MG/ML
INJECTION INTRAMUSCULAR; INTRAVENOUS PRN
Status: DISCONTINUED | OUTPATIENT
Start: 2022-09-16 | End: 2022-09-16 | Stop reason: SDUPTHER

## 2022-09-16 RX ORDER — SODIUM CHLORIDE 0.9 % (FLUSH) 0.9 %
5-40 SYRINGE (ML) INJECTION PRN
Status: DISCONTINUED | OUTPATIENT
Start: 2022-09-16 | End: 2022-09-19 | Stop reason: HOSPADM

## 2022-09-16 RX ORDER — DEXTROSE, SODIUM CHLORIDE, AND POTASSIUM CHLORIDE 5; .45; .15 G/100ML; G/100ML; G/100ML
INJECTION INTRAVENOUS CONTINUOUS
Status: DISCONTINUED | OUTPATIENT
Start: 2022-09-16 | End: 2022-09-18

## 2022-09-16 RX ORDER — KETAMINE HCL IN NACL, ISO-OSM 100MG/10ML
SYRINGE (ML) INJECTION PRN
Status: DISCONTINUED | OUTPATIENT
Start: 2022-09-16 | End: 2022-09-16 | Stop reason: SDUPTHER

## 2022-09-16 RX ORDER — LIDOCAINE HYDROCHLORIDE 10 MG/ML
1 INJECTION, SOLUTION EPIDURAL; INFILTRATION; INTRACAUDAL; PERINEURAL
Status: DISCONTINUED | OUTPATIENT
Start: 2022-09-16 | End: 2022-09-16 | Stop reason: HOSPADM

## 2022-09-16 RX ADMIN — POTASSIUM CHLORIDE, DEXTROSE MONOHYDRATE AND SODIUM CHLORIDE: 150; 5; 450 INJECTION, SOLUTION INTRAVENOUS at 16:51

## 2022-09-16 RX ADMIN — Medication 10 MG: at 13:17

## 2022-09-16 RX ADMIN — MAGNESIUM SULFATE HEPTAHYDRATE 1 G: 500 INJECTION, SOLUTION INTRAMUSCULAR; INTRAVENOUS at 11:56

## 2022-09-16 RX ADMIN — Medication 30 MG: at 11:56

## 2022-09-16 RX ADMIN — Medication 10 MG: at 12:44

## 2022-09-16 RX ADMIN — ONDANSETRON 4 MG: 2 INJECTION INTRAMUSCULAR; INTRAVENOUS at 11:56

## 2022-09-16 RX ADMIN — ROCURONIUM BROMIDE 10 MG: 10 INJECTION, SOLUTION INTRAVENOUS at 13:15

## 2022-09-16 RX ADMIN — LIDOCAINE HYDROCHLORIDE 100 MG: 20 INJECTION, SOLUTION EPIDURAL; INFILTRATION; INTRACAUDAL; PERINEURAL at 11:49

## 2022-09-16 RX ADMIN — SUGAMMADEX 200 MG: 100 INJECTION, SOLUTION INTRAVENOUS at 14:11

## 2022-09-16 RX ADMIN — ROCURONIUM BROMIDE 40 MG: 10 INJECTION, SOLUTION INTRAVENOUS at 11:56

## 2022-09-16 RX ADMIN — SODIUM CHLORIDE, POTASSIUM CHLORIDE, SODIUM LACTATE AND CALCIUM CHLORIDE: 600; 310; 30; 20 INJECTION, SOLUTION INTRAVENOUS at 11:44

## 2022-09-16 RX ADMIN — CEFAZOLIN 2000 MG: 2 INJECTION, POWDER, FOR SOLUTION INTRAMUSCULAR; INTRAVENOUS at 11:42

## 2022-09-16 RX ADMIN — HYDROMORPHONE HYDROCHLORIDE 0.5 MG: 2 INJECTION, SOLUTION INTRAMUSCULAR; INTRAVENOUS; SUBCUTANEOUS at 15:19

## 2022-09-16 RX ADMIN — PROPOFOL 230 MG: 10 INJECTION, EMULSION INTRAVENOUS at 11:49

## 2022-09-16 RX ADMIN — HYDROMORPHONE HYDROCHLORIDE 0.5 MG: 2 INJECTION, SOLUTION INTRAMUSCULAR; INTRAVENOUS; SUBCUTANEOUS at 12:44

## 2022-09-16 RX ADMIN — FENTANYL CITRATE 50 MCG: 50 INJECTION, SOLUTION INTRAMUSCULAR; INTRAVENOUS at 11:49

## 2022-09-16 RX ADMIN — Medication 140 MG: at 11:49

## 2022-09-16 RX ADMIN — HYDROMORPHONE HYDROCHLORIDE 0.5 MG: 2 INJECTION, SOLUTION INTRAMUSCULAR; INTRAVENOUS; SUBCUTANEOUS at 14:55

## 2022-09-16 RX ADMIN — MIDAZOLAM 2 MG: 1 INJECTION INTRAMUSCULAR; INTRAVENOUS at 11:43

## 2022-09-16 RX ADMIN — PANTOPRAZOLE SODIUM 40 MG: 40 INJECTION, POWDER, FOR SOLUTION INTRAVENOUS at 16:45

## 2022-09-16 RX ADMIN — DEXAMETHASONE SODIUM PHOSPHATE 8 MG: 4 INJECTION, SOLUTION INTRAMUSCULAR; INTRAVENOUS at 11:56

## 2022-09-16 RX ADMIN — KETOROLAC TROMETHAMINE 30 MG: 60 INJECTION, SOLUTION INTRAMUSCULAR at 14:02

## 2022-09-16 RX ADMIN — FENTANYL CITRATE 25 MCG: 50 INJECTION INTRAMUSCULAR; INTRAVENOUS at 15:30

## 2022-09-16 RX ADMIN — HYDROMORPHONE HYDROCHLORIDE 1 MG: 1 INJECTION, SOLUTION INTRAMUSCULAR; INTRAVENOUS; SUBCUTANEOUS at 20:03

## 2022-09-16 RX ADMIN — FENTANYL CITRATE 50 MCG: 50 INJECTION, SOLUTION INTRAMUSCULAR; INTRAVENOUS at 12:13

## 2022-09-16 RX ADMIN — SODIUM CHLORIDE, POTASSIUM CHLORIDE, SODIUM LACTATE AND CALCIUM CHLORIDE: 600; 310; 30; 20 INJECTION, SOLUTION INTRAVENOUS at 13:14

## 2022-09-16 RX ADMIN — ROCURONIUM BROMIDE 10 MG: 10 INJECTION, SOLUTION INTRAVENOUS at 11:49

## 2022-09-16 RX ADMIN — ROCURONIUM BROMIDE 20 MG: 10 INJECTION, SOLUTION INTRAVENOUS at 12:21

## 2022-09-16 RX ADMIN — HYDROMORPHONE HYDROCHLORIDE 1 MG: 1 INJECTION, SOLUTION INTRAMUSCULAR; INTRAVENOUS; SUBCUTANEOUS at 22:24

## 2022-09-16 RX ADMIN — METRONIDAZOLE 500 MG: 500 INJECTION, SOLUTION INTRAVENOUS at 11:58

## 2022-09-16 RX ADMIN — HYDROMORPHONE HYDROCHLORIDE 0.5 MG: 2 INJECTION, SOLUTION INTRAMUSCULAR; INTRAVENOUS; SUBCUTANEOUS at 15:04

## 2022-09-16 RX ADMIN — HYDROMORPHONE HYDROCHLORIDE 0.5 MG: 2 INJECTION, SOLUTION INTRAMUSCULAR; INTRAVENOUS; SUBCUTANEOUS at 15:12

## 2022-09-16 RX ADMIN — HYDROMORPHONE HYDROCHLORIDE 1 MG: 1 INJECTION, SOLUTION INTRAMUSCULAR; INTRAVENOUS; SUBCUTANEOUS at 17:07

## 2022-09-16 ASSESSMENT — PAIN - FUNCTIONAL ASSESSMENT
PAIN_FUNCTIONAL_ASSESSMENT: 0-10
PAIN_FUNCTIONAL_ASSESSMENT: PREVENTS OR INTERFERES SOME ACTIVE ACTIVITIES AND ADLS
PAIN_FUNCTIONAL_ASSESSMENT: PREVENTS OR INTERFERES WITH MANY ACTIVE NOT PASSIVE ACTIVITIES

## 2022-09-16 ASSESSMENT — PAIN SCALES - GENERAL
PAINLEVEL_OUTOF10: 7
PAINLEVEL_OUTOF10: 8
PAINLEVEL_OUTOF10: 10
PAINLEVEL_OUTOF10: 9
PAINLEVEL_OUTOF10: 8
PAINLEVEL_OUTOF10: 10
PAINLEVEL_OUTOF10: 8

## 2022-09-16 ASSESSMENT — PAIN DESCRIPTION - LOCATION
LOCATION: ABDOMEN

## 2022-09-16 ASSESSMENT — PAIN DESCRIPTION - DESCRIPTORS
DESCRIPTORS: ACHING;BURNING
DESCRIPTORS: SORE
DESCRIPTORS: SHARP
DESCRIPTORS: ACHING;BURNING

## 2022-09-16 ASSESSMENT — PAIN DESCRIPTION - ORIENTATION
ORIENTATION: ANTERIOR
ORIENTATION: ANTERIOR
ORIENTATION: MID

## 2022-09-16 ASSESSMENT — ENCOUNTER SYMPTOMS: SHORTNESS OF BREATH: 0

## 2022-09-16 ASSESSMENT — PAIN DESCRIPTION - PAIN TYPE
TYPE: SURGICAL PAIN

## 2022-09-16 NOTE — H&P
Mendez Smith     HPI: 40year old male here for sigmoid colon resection     Past Medical History:   Diagnosis Date    GERD (gastroesophageal reflux disease)        Past Surgical History:   Procedure Laterality Date    COLONOSCOPY      COLONOSCOPY  2/3/16    diverticulosis    EYE SURGERY      orbital floor    PILONIDAL CYST EXCISION N/A 2020    EXCISION OF PILONIDAL CYST performed by Favio Lewis MD at [address] [place] [address] History     Socioeconomic History    Marital status:      Spouse name: Not on file    Number of children: Not on file    Years of education: Not on file    Highest education level: Not on file   Occupational History    Occupation: engineering, mechanical   Tobacco Use    Smoking status: Former     Packs/day: 0.25     Years: 4.00     Pack years: 1.00     Types: Cigarettes     Quit date: 2018     Years since quittin.6    Smokeless tobacco: Former     Types: Chew    Tobacco comments:     socially   Vaping Use    Vaping Use: Never used   Substance and Sexual Activity    Alcohol use: Yes     Alcohol/week: 0.0 standard drinks     Comment: socially    Drug use: No    Sexual activity: Yes     Partners: Female   Other Topics Concern    Not on file   Social History Narrative    Not on file     Social Determinants of Health     Financial Resource Strain: Not on file   Food Insecurity: Not on file   Transportation Needs: Not on file   Physical Activity: Not on file   Stress: Not on file   Social Connections: Not on file   Intimate Partner Violence: Not on file   Housing Stability: Not on file       Allergies: Allergies   Allergen Reactions    Penicillins Rash       Prior to Admission medications    Medication Sig Start Date End Date Taking? Authorizing Provider   omeprazole (PRILOSEC) 10 MG capsule Take 10 mg by mouth daily    Historical Provider, MD       Active Problems:    * No active hospital problems. *  Resolved Problems:    * No resolved hospital problems. *      Blood pressure (!) 142/86, pulse 52, temperature 97.6 °F (36.4 °C), temperature source Temporal, resp. rate 15, height 5' 8\" (1.727 m), weight 203 lb 0.6 oz (92.1 kg), SpO2 98 %. Review of Systems    Physical Exam  Cardiovascular:      Rate and Rhythm: Normal rate and regular rhythm. Pulmonary:      Effort: Pulmonary effort is normal.      Breath sounds: Normal breath sounds.        Assessment:  Recurrent sigmoid diverticulitis    Plan:  Sigmoid colon resection    Kenzie Wall MD  9/16/2022

## 2022-09-16 NOTE — PROGRESS NOTES
Ambulated with patient in the dodge, tolerated well. Patient back in bed. Call light and belongings within reach.

## 2022-09-16 NOTE — PROGRESS NOTES
Patient up from surgery. VSS. x5 surgical sites to abdomen C/D/I. Urbina catheter in to gravity. Patient oriented to room, belongings and call light within reach. Family in room with patient. The care plan and education has been reviewed and mutually agreed upon with the patient.

## 2022-09-16 NOTE — PROGRESS NOTES
Incentive Spirometry education and demonstration completed by Respiratory Therapy Yes      Response to education: Good     Teaching Time: 15 minutes    Minimum Predicted Vital Capacity - 616 mL. Patient's Actual Vital Capacity - 1500 mL. Turning over to Nursing for routine follow-up Yes.     Comments: IS goal Met    Electronically signed by Susan Castillo RCP on 9/16/2022 at 5:20 PM

## 2022-09-16 NOTE — ANESTHESIA POSTPROCEDURE EVALUATION
Department of Anesthesiology  Postprocedure Note    Patient: Billy Knutson  MRN: [id number]  YOB: 1980  Date of evaluation: 9/16/2022      Procedure Summary     Date: 09/16/22 Room / Location: [facility] OR [address] [place] [address]    Anesthesia Start: 9194 Anesthesia Stop: 0281    Procedure: ROBOT ASSIST LAPAROSCOPIC SIGMOID COLON RESECTION (Abdomen) Diagnosis:       Sigmoid diverticulitis      (Sigmoid diverticulitis [K57.32])    Surgeons: Lida Johnson MD Responsible Provider: Herlinda Rao MD    Anesthesia Type: general ASA Status: 2          Anesthesia Type: No value filed.     Joyce Phase I: Joyce Score: 7    Joyce Phase II:        Anesthesia Post Evaluation    Patient location during evaluation: PACU  Patient participation: complete - patient participated  Level of consciousness: awake and alert  Airway patency: patent  Nausea & Vomiting: no vomiting and no nausea  Complications: no  Cardiovascular status: hemodynamically stable  Respiratory status: acceptable  Hydration status: stable

## 2022-09-16 NOTE — ANESTHESIA PRE PROCEDURE
Department of Anesthesiology  Preprocedure Note       Name:  Lizzeth Jackman   Age:  40 y.o.  :  1980                                          MRN:  [id number]         Date:  2022      Surgeon: Aly Abernathy):  Hal Mansfield MD    Procedure: Procedure(s):  EXCISION OF PILONIDAL CYST    Medications prior to admission:   Prior to Admission medications    Medication Sig Start Date End Date Taking? Authorizing Provider   omeprazole (PRILOSEC) 10 MG capsule Take 10 mg by mouth daily    Historical Provider, MD       Current medications:    No current outpatient medications on file. No current facility-administered medications for this visit. Allergies: Allergies   Allergen Reactions    Penicillins Rash       Problem List:    Patient Active Problem List   Diagnosis Code    Skin lesion L98.9       Past Medical History:        Diagnosis Date    GERD (gastroesophageal reflux disease)        Past Surgical History:        Procedure Laterality Date    COLONOSCOPY      COLONOSCOPY  2/3/16    diverticulosis    EYE SURGERY      orbital floor    PILONIDAL CYST EXCISION N/A 2020    EXCISION OF PILONIDAL CYST performed by Hal Mansfield MD at [address] [place] [address] History:    Social History     Tobacco Use    Smoking status: Former     Packs/day: 0.25     Years: 4.00     Pack years: 1.00     Types: Cigarettes     Quit date: 2018     Years since quittin.6    Smokeless tobacco: Former     Types: Chew    Tobacco comments:     socially   Substance Use Topics    Alcohol use: Yes     Alcohol/week: 0.0 standard drinks     Comment: socially                                Counseling given: Not Answered  Tobacco comments: socially      Vital Signs (Current): There were no vitals filed for this visit.                                            BP Readings from Last 3 Encounters:   06/10/22 121/64   22 108/72   21 110/68       NPO Status: BMI:   Wt Readings from Last 3 Encounters:   08/29/22 215 lb (97.5 kg)   06/10/22 201 lb (91.2 kg)   05/06/22 207 lb (93.9 kg)     There is no height or weight on file to calculate BMI.    CBC:   Lab Results   Component Value Date/Time    WBC 19.6 07/13/2021 08:03 AM    RBC 4.96 07/13/2021 08:03 AM    HGB 15.5 07/13/2021 08:03 AM    HCT 44.3 07/13/2021 08:03 AM    MCV 89.2 07/13/2021 08:03 AM    RDW 12.9 07/13/2021 08:03 AM     07/13/2021 08:03 AM       CMP:   Lab Results   Component Value Date/Time     07/13/2021 08:03 AM    K 4.7 07/13/2021 08:03 AM     07/13/2021 08:03 AM    CO2 20 07/13/2021 08:03 AM    BUN 12 07/13/2021 08:03 AM    CREATININE 0.7 07/13/2021 08:03 AM    GFRAA >60 07/13/2021 08:03 AM    AGRATIO 1.3 07/13/2021 08:03 AM    LABGLOM >60 07/13/2021 08:03 AM    GLUCOSE 125 07/13/2021 08:03 AM    PROT 8.2 07/13/2021 08:03 AM    CALCIUM 9.7 07/13/2021 08:03 AM    BILITOT 0.9 07/13/2021 08:03 AM    ALKPHOS 66 07/13/2021 08:03 AM    AST 30 07/13/2021 08:03 AM    ALT 40 07/13/2021 08:03 AM       POC Tests: No results for input(s): POCGLU, POCNA, POCK, POCCL, POCBUN, POCHEMO, POCHCT in the last 72 hours.     Coags: No results found for: PROTIME, INR, APTT    HCG (If Applicable): No results found for: PREGTESTUR, PREGSERUM, HCG, HCGQUANT     ABGs: No results found for: PHART, PO2ART, JUS3ZJN, SQU9SIF, BEART, B0MXPHNN     Type & Screen (If Applicable):  No results found for: LABABO, LABRH    Drug/Infectious Status (If Applicable):  No results found for: HIV, HEPCAB    COVID-19 Screening (If Applicable):   Lab Results   Component Value Date/Time    COVID19 NOT DETECTED 08/07/2020 03:02 PM         Anesthesia Evaluation  Patient summary reviewed and Nursing notes reviewed no history of anesthetic complications:   Airway: Mallampati: I  TM distance: >3 FB   Neck ROM: full  Mouth opening: > = 3 FB   Dental: normal exam         Pulmonary:       (-)

## 2022-09-17 LAB
BASOPHILS ABSOLUTE: 0 K/UL (ref 0–0.2)
BASOPHILS RELATIVE PERCENT: 0.2 %
EOSINOPHILS ABSOLUTE: 0 K/UL (ref 0–0.6)
EOSINOPHILS RELATIVE PERCENT: 0 %
HCT VFR BLD CALC: 40.9 % (ref 40.5–52.5)
HEMOGLOBIN: 13.7 G/DL (ref 13.5–17.5)
LYMPHOCYTES ABSOLUTE: 2 K/UL (ref 1–5.1)
LYMPHOCYTES RELATIVE PERCENT: 14.2 %
MCH RBC QN AUTO: 30.6 PG (ref 26–34)
MCHC RBC AUTO-ENTMCNC: 33.5 G/DL (ref 31–36)
MCV RBC AUTO: 91.1 FL (ref 80–100)
MONOCYTES ABSOLUTE: 0.8 K/UL (ref 0–1.3)
MONOCYTES RELATIVE PERCENT: 5.4 %
NEUTROPHILS ABSOLUTE: 11.2 K/UL (ref 1.7–7.7)
NEUTROPHILS RELATIVE PERCENT: 80.2 %
PDW BLD-RTO: 12.8 % (ref 12.4–15.4)
PLATELET # BLD: 253 K/UL (ref 135–450)
PMV BLD AUTO: 7.6 FL (ref 5–10.5)
RBC # BLD: 4.5 M/UL (ref 4.2–5.9)
WBC # BLD: 14 K/UL (ref 4–11)

## 2022-09-17 PROCEDURE — 2580000003 HC RX 258: Performed by: SURGERY

## 2022-09-17 PROCEDURE — 2500000003 HC RX 250 WO HCPCS: Performed by: SURGERY

## 2022-09-17 PROCEDURE — 36415 COLL VENOUS BLD VENIPUNCTURE: CPT

## 2022-09-17 PROCEDURE — C9113 INJ PANTOPRAZOLE SODIUM, VIA: HCPCS | Performed by: SURGERY

## 2022-09-17 PROCEDURE — APPNB30 APP NON BILLABLE TIME 0-30 MINS: Performed by: NURSE PRACTITIONER

## 2022-09-17 PROCEDURE — 6370000000 HC RX 637 (ALT 250 FOR IP): Performed by: NURSE PRACTITIONER

## 2022-09-17 PROCEDURE — 1200000000 HC SEMI PRIVATE

## 2022-09-17 PROCEDURE — APPSS15 APP SPLIT SHARED TIME 0-15 MINUTES: Performed by: NURSE PRACTITIONER

## 2022-09-17 PROCEDURE — 99024 POSTOP FOLLOW-UP VISIT: CPT | Performed by: SURGERY

## 2022-09-17 PROCEDURE — 6360000002 HC RX W HCPCS: Performed by: SURGERY

## 2022-09-17 PROCEDURE — 85025 COMPLETE CBC W/AUTO DIFF WBC: CPT

## 2022-09-17 PROCEDURE — 51702 INSERT TEMP BLADDER CATH: CPT

## 2022-09-17 RX ORDER — ACETAMINOPHEN 500 MG
1000 TABLET ORAL EVERY 8 HOURS SCHEDULED
Status: DISCONTINUED | OUTPATIENT
Start: 2022-09-17 | End: 2022-09-19 | Stop reason: HOSPADM

## 2022-09-17 RX ADMIN — PANTOPRAZOLE SODIUM 40 MG: 40 INJECTION, POWDER, FOR SOLUTION INTRAVENOUS at 08:18

## 2022-09-17 RX ADMIN — ACETAMINOPHEN 1000 MG: 500 TABLET ORAL at 21:30

## 2022-09-17 RX ADMIN — POTASSIUM CHLORIDE, DEXTROSE MONOHYDRATE AND SODIUM CHLORIDE: 150; 5; 450 INJECTION, SOLUTION INTRAVENOUS at 02:51

## 2022-09-17 RX ADMIN — ACETAMINOPHEN 1000 MG: 500 TABLET ORAL at 14:15

## 2022-09-17 RX ADMIN — HYDROMORPHONE HYDROCHLORIDE 1 MG: 1 INJECTION, SOLUTION INTRAMUSCULAR; INTRAVENOUS; SUBCUTANEOUS at 06:17

## 2022-09-17 RX ADMIN — ENOXAPARIN SODIUM 40 MG: 100 INJECTION SUBCUTANEOUS at 08:18

## 2022-09-17 RX ADMIN — HYDROMORPHONE HYDROCHLORIDE 1 MG: 1 INJECTION, SOLUTION INTRAMUSCULAR; INTRAVENOUS; SUBCUTANEOUS at 16:27

## 2022-09-17 RX ADMIN — HYDROMORPHONE HYDROCHLORIDE 1 MG: 1 INJECTION, SOLUTION INTRAMUSCULAR; INTRAVENOUS; SUBCUTANEOUS at 18:30

## 2022-09-17 RX ADMIN — HYDROMORPHONE HYDROCHLORIDE 1 MG: 1 INJECTION, SOLUTION INTRAMUSCULAR; INTRAVENOUS; SUBCUTANEOUS at 21:31

## 2022-09-17 RX ADMIN — HYDROMORPHONE HYDROCHLORIDE 1 MG: 1 INJECTION, SOLUTION INTRAMUSCULAR; INTRAVENOUS; SUBCUTANEOUS at 02:41

## 2022-09-17 RX ADMIN — HYDROMORPHONE HYDROCHLORIDE 1 MG: 1 INJECTION, SOLUTION INTRAMUSCULAR; INTRAVENOUS; SUBCUTANEOUS at 00:34

## 2022-09-17 RX ADMIN — HYDROMORPHONE HYDROCHLORIDE 1 MG: 1 INJECTION, SOLUTION INTRAMUSCULAR; INTRAVENOUS; SUBCUTANEOUS at 14:16

## 2022-09-17 RX ADMIN — HYDROMORPHONE HYDROCHLORIDE 1 MG: 1 INJECTION, SOLUTION INTRAMUSCULAR; INTRAVENOUS; SUBCUTANEOUS at 10:33

## 2022-09-17 RX ADMIN — SODIUM CHLORIDE, PRESERVATIVE FREE 10 ML: 5 INJECTION INTRAVENOUS at 08:18

## 2022-09-17 ASSESSMENT — PAIN DESCRIPTION - ONSET: ONSET: ON-GOING

## 2022-09-17 ASSESSMENT — PAIN SCALES - GENERAL
PAINLEVEL_OUTOF10: 10
PAINLEVEL_OUTOF10: 8
PAINLEVEL_OUTOF10: 10
PAINLEVEL_OUTOF10: 9
PAINLEVEL_OUTOF10: 10
PAINLEVEL_OUTOF10: 10
PAINLEVEL_OUTOF10: 8

## 2022-09-17 ASSESSMENT — PAIN DESCRIPTION - FREQUENCY: FREQUENCY: INTERMITTENT

## 2022-09-17 ASSESSMENT — PAIN DESCRIPTION - LOCATION
LOCATION: ABDOMEN

## 2022-09-17 ASSESSMENT — PAIN DESCRIPTION - DESCRIPTORS
DESCRIPTORS: SORE
DESCRIPTORS: SORE;ACHING
DESCRIPTORS: ACHING;SORE
DESCRIPTORS: BURNING;ACHING

## 2022-09-17 ASSESSMENT — PAIN - FUNCTIONAL ASSESSMENT
PAIN_FUNCTIONAL_ASSESSMENT: ACTIVITIES ARE NOT PREVENTED

## 2022-09-17 ASSESSMENT — PAIN DESCRIPTION - PAIN TYPE: TYPE: SURGICAL PAIN

## 2022-09-17 ASSESSMENT — PAIN DESCRIPTION - ORIENTATION
ORIENTATION: ANTERIOR
ORIENTATION: ANTERIOR

## 2022-09-17 NOTE — PLAN OF CARE
Problem: Skin/Tissue Integrity - Adult  Goal: Skin integrity remains intact  Outcome: Progressing  Goal: Incisions, wounds, or drain sites healing without S/S of infection  Outcome: Progressing     Problem: Gastrointestinal - Adult  Goal: Minimal or absence of nausea and vomiting  Outcome: Progressing  Goal: Maintains or returns to baseline bowel function  Outcome: Progressing  Goal: Maintains adequate nutritional intake  Outcome: Progressing     Problem: Genitourinary - Adult  Goal: Absence of urinary retention  Outcome: Progressing

## 2022-09-17 NOTE — PROGRESS NOTES
[facility] Lapar[facility]        Progress Note    Patient Name: Lucho Ayala  MRN: [id number]  YOB: 1980  Date of Evaluation: 2022    Subjective:  No acute events overnight  Pain controlled  No nausea or vomiting, denies appetite  No flatus or BM  Resting in bed at this time, ambulates without difficulty    Post-Operative Day #1      Vital Signs:  Patient Vitals for the past 24 hrs:   BP Temp Temp src Pulse Resp SpO2 Height Weight   22 1033 -- -- -- -- 18 -- -- --   22 0800 (!) 147/74 97.8 °F (36.6 °C) Oral 64 18 94 % -- --   22 0015 125/72 98.3 °F (36.8 °C) Oral 74 18 97 % -- --   22 2115 (!) 151/70 -- -- 75 18 -- -- --   22 1945 136/79 98.6 °F (37 °C) Oral 76 18 95 % -- --   22 1645 (!) 147/81 -- -- 67 16 99 % -- --   22 1615 (!) 152/82 -- -- 70 15 100 % -- --   22 1553 -- -- -- -- -- -- 5' 8\" (1.727 m) 203 lb (92.1 kg)   22 1545 135/80 97.5 °F (36.4 °C) Axillary 80 14 92 % -- --   22 1533 -- -- -- 68 -- -- -- --   22 1530 136/82 -- -- 78 14 99 % -- --   22 1515 122/82 -- -- 73 17 95 % -- --   22 1500 136/80 -- -- 75 13 97 % -- --   22 1445 128/72 -- -- 68 12 95 % -- --   22 1440 124/78 97.5 °F (36.4 °C) Temporal 69 11 95 % -- --   22 1435 121/68 -- -- 68 11 95 % -- --   22 1430 119/73 -- -- 68 11 95 % -- --   22 1427 120/66 97.4 °F (36.3 °C) Temporal 67 12 95 % -- --      TEMPERATURE HISTORY 24H: Temp (24hrs), Av.9 °F (36.6 °C), Min:97.4 °F (36.3 °C), Max:98.6 °F (37 °C)    BLOOD PRESSURE HISTORY: Systolic (75PEO), PNK:873 , Min:119 , QYO:174    Diastolic (72LVS), SYS:48, Min:66, Max:86      Intake/Output:  I/O last 3 completed shifts:   In: 1500 [I.V.:1400; IV Piggyback:100]  Out: 2250 [Urine:2150; Blood:100]  I/O this shift:  In: -   Out: 1300 [Urine:1300]  Drain/tube Output:       Physical Exam:  General: awake, alert, oriented to  person, place, time  Lungs: unlabored respirations  Abdomen: soft, mild distention, incisional tenderness only, bowel sounds present   Skin/Wound: healing well, no drainage, no erythema, well approximated    Labs:  CBC:    Recent Labs     09/16/22  1038 09/17/22  0416   WBC 7.1 14.0*   HGB 15.7 13.7   HCT 46.2 40.9    253     BMP:    Recent Labs     09/16/22  1038      K 4.6      CO2 24   BUN 15   CREATININE 0.8*   GLUCOSE 95     Hepatic:  No results for input(s): AST, ALT, ALB, BILITOT, ALKPHOS in the last 72 hours. Amylase:  No results found for: AMYLASE  Lipase:    Lab Results   Component Value Date/Time    LIPASE 22.0 07/13/2021 08:03 AM      Mag:  No results found for: MG  Phos:   No results found for: PHOS   Coags: No results found for: PROTIME, INR, APTT    Cultures:  Anaerobic culture  No results found for: LABANAE  Fungus stain  No results found for requested labs within last 30 days. Gram stain  No results found for requested labs within last 30 days. Organism  No results found for: [facility]  Surgical culture  No results found for: CXSURG  Blood culture 1  No results found for requested labs within last 30 days. Blood culture 2  No results found for requested labs within last 30 days. Fecal occult  No results found for requested labs within last 30 days. GI bacterial pathogens by PCR  No results found for requested labs within last 30 days. C. difficile  No results found for requested labs within last 30 days. Urine culture  No results found for: LABURIN    Pathology:  Pathology results pending     Imaging:  I have personally reviewed the following films:    No results found.     Scheduled Meds:   acetaminophen  1,000 mg Oral 3 times per day    sodium chloride flush  5-40 mL IntraVENous 2 times per day    enoxaparin  40 mg SubCUTAneous Daily    pantoprazole  40 mg IntraVENous Daily     Continuous Infusions:   dextrose 5% and 0.45% NaCl with KCl 20 mEq 100 mL/hr at 09/17/22 0251 sodium chloride       PRN Meds:.sodium chloride flush, sodium chloride, HYDROmorphone, ondansetron      Assessment:  40 y.o. male admitted with   1. Sigmoid diverticulitis        OR Date 9/16/2022, robotic laparoscopic sigmoid colon resection with coloproctostomy for recurrent sigmoid diverticulitis  GERD      Plan:  1. Pain controlled, no nausea/vomiting, incisions healing well, no bowel function yet, vitals/labs stable; continued supportive care, remove Urbina catheter  2. Start clear liquid diet as tolerated; monitor bowel function  3. IV hydration; monitor and correct electrolytes  4. Activity as tolerated, ambulate TID, up to chair for all meals  5. Pulmonary toilet, incentive spirometry  6. PRN analgesics and antiemetics--minimizing narcotics as tolerated  7. DVT prophylaxis with  Lovenox and SCD's  8. Management of medical comorbid etiologies, PPI resumed for GERD    EDUCATION:  Educated patient on plan of care and disease process--all questions answered. Plans discussed with patient and nursing. Reviewed and discussed with Dr. Lady Vicente.       Signed:  JAGRUTI Rider CNP  9/17/2022 11:09 AM    Postop day #1 status post lap sigmoid colon  Doing fairly well  Remove Urbina  Clear liquid diet  Continue to ambulate in hallways

## 2022-09-17 NOTE — PROGRESS NOTES
Shift assessment complete. VSS. Ambulated in dodge x1 overnight. Requesting PRN often for pain. Pt consistently reporting pain 10/10. No BM or flatus. Urbina draining. The care plan and education has been reviewed and mutually agreed upon with the patient. Patient remains free from falls. All fall precautions in place. Yellow bracelet on patient. SAFE sign on door. Bed and chair alarms being used. Bed in lowest position. Will monitor.

## 2022-09-17 NOTE — PROGRESS NOTES
Shift assessment completed. Neuro WNL. Reports pain 8/10 to abd at this time. AM meds administered per MAR. Surgical incision CDI; no redness or drainage noted. Patient ambulated in hallway. Urbina remains in place. The care plan and education has been reviewed and mutually agreed upon with the patient. Standard safety measures in place.

## 2022-09-17 NOTE — OP NOTE
[facility]                     [address] [facility], [address] [facility]                                OPERATIVE REPORT    PATIENT NAME: Daysi Garcia                 :        1980  MED REC NO:   [id number]                          ROOM:       [id number]  ACCOUNT NO:   [de-identified]                           ADMIT DATE: 2022  PROVIDER:     Bobby Clark MD    DATE OF PROCEDURE:  2022    PREOPERATIVE DIAGNOSIS:  Recurrent sigmoid diverticulitis. POSTOPERATIVE DIAGNOSIS:  Recurrent sigmoid diverticulitis. PROCEDURE:  Robotic laparoscopic sigmoid colon resection with  coloproctostomy. SURGEON:  Bobby Clark MD    ANESTHESIA:  General endotracheal and local.    ESTIMATED BLOOD LOSS:  Minimal.    COMPLICATIONS:  None. SPECIMEN:  Sigmoid colon, additional proximal colon segment, anastomotic  rings. OPERATIVE INDICATIONS AND CONSENT:  The patient is a 60-year-old male  with recurrent episodes of sigmoid diverticulitis. He is brought in  today for sigmoid colon resection. He was explained the risks, benefits  and possible complications including risk of bleeding, bowel injury or  anastomotic leakage. DETAILS OF THE PROCEDURE:  The patient was brought to the operative  suite and placed in a supine position on the operative table. After  general endotracheal anesthesia, he was placed in dorsal lithotomy  position and then prepped and draped in usual sterile fashion. We made an 8-mm transverse incision in the right upper quadrant. A  Veress needle was passed through the peritoneal cavity and after  adequate insufflation, an 8-mm trocar was placed at this site. We  placed an 8-mm trocar in the upper epigastrium, followed by an 8-mm mid  right abdominal trocar and a 12-mm right lower quadrant trocar. The robot was then docked to the trocar. The bed was placed in steep  Trendelenburg and tilted toward the right side.   There was some  adhesions of the sigmoid colon to the left pelvic sidewall. These were  taken down using cautery. The proximal sigmoid colon and descending  colon was mobilized along the white line of Toldt up to the level of the  splenic flexure. We did not have to formally mobilize the splenic  flexure. There is some adhesions of the loops of sigmoid colon within the pelvis. Loops were dissected of all adhesions using cautery. We selected a site for proximal transection of the colon. The fat was  cleared off of the colon circumferentially before it was divided at the  descending sigmoid colon junction with a transverse firing of a robotic  green stapler load. The mesentery was then divided using the vessel  sealing device. The upper rectum was cleared circumferentially of all  fat before it was divided with a transverse firing of a 15-mm robotic  green stapler load. We selected an extraction site in the left side of the abdomen just  below the level of the umbilicus. We made a 5-cm transverse incision. Dissection was then carried down to the level of the external abdominal  oblique. The external abdominal oblique was opened transversely. The  rectus abdominis was then traversed via muscle splitting fashion. Posterior fascia and peritoneum were then opened. An Martin retractor  was placed into the wound. The specimen was then delivered through the  [facility] retractor. The proximal colon was next delivered through the  [facility] retractor. The proximal colon was cleared of all fat about 2 cm proximal to the  staple line. We then placed a pursestring device across the colon and  fired it. The colon distal to the pursestring device was excised and  sent as \"additional colonic segment. \"  We selected a 29-mm EEA stapler. The anvil was placed in the proximal colon and the pursestring device  was tied. We placed an additional 2-0 silk pursestring around the anvil  for extra security.   After inspection of the anvil, it was reduced into  the abdominal cavity and the line cap was placed on the Martin  retractor. The rectum was serially dilated. The 29-mm stapler was then brought up  to the staple line and brought out just posterior to the  transversely-oriented staple line on the rectum. The stapler was  connected to the anvil. The device was slowly closed making sure that  there is no tissue between the two bowel segments. We did have proper  orientation of the proximal colon. The stapler was fired and removed. We did have two complete anastomotic rings. We placed warm saline in  the pelvis and clamped the colon proximally. With air insufflation  through the rectum, there is no evidence of anastomotic leakage. We had excellent hemostasis within the pelvis. The omentum was draped  over the pelvis. The Martin retractor was then removed. The posterior fascia and  peritoneum were closed with running 0 Vicryl suture. The external  abdominal oblique was closed with running a loop PDS suture. The abdomen was then reinsufflated. The 12-mm trocar in the right lower  quadrant was closed with an 0 Vicryl suture using a PMI needle. The  remaining trocars were removed under direct visualization. The abdomen  was de-insufflated. The subcutaneous tissue of the extraction site was closed with  interrupted 3-0 Vicryl sutures. The skin at the incisions were closed  with running 4-0 subcuticular sutures. Dermabond was then applied. The  patient tolerated the procedure without difficulty and was transferred  to recovery room in stable condition. Serena Duval.  Shane Rivero MD    D: 09/16/2022 16:07:17       T: 09/16/2022 16:10:40     [id number]  Job#: [id number]     Doc#: [id number]    CC:

## 2022-09-18 LAB
ANION GAP SERPL CALCULATED.3IONS-SCNC: 8 MMOL/L (ref 3–16)
BUN BLDV-MCNC: 8 MG/DL (ref 7–20)
CALCIUM SERPL-MCNC: 9.1 MG/DL (ref 8.3–10.6)
CHLORIDE BLD-SCNC: 104 MMOL/L (ref 99–110)
CO2: 26 MMOL/L (ref 21–32)
CREAT SERPL-MCNC: 0.8 MG/DL (ref 0.9–1.3)
GFR AFRICAN AMERICAN: >60
GFR NON-AFRICAN AMERICAN: >60
GLUCOSE BLD-MCNC: 104 MG/DL (ref 70–99)
HCT VFR BLD CALC: 39.7 % (ref 40.5–52.5)
HEMOGLOBIN: 13.2 G/DL (ref 13.5–17.5)
MCH RBC QN AUTO: 30.8 PG (ref 26–34)
MCHC RBC AUTO-ENTMCNC: 33.3 G/DL (ref 31–36)
MCV RBC AUTO: 92.5 FL (ref 80–100)
PDW BLD-RTO: 12.7 % (ref 12.4–15.4)
PLATELET # BLD: 218 K/UL (ref 135–450)
PMV BLD AUTO: 7.2 FL (ref 5–10.5)
POTASSIUM SERPL-SCNC: 4.1 MMOL/L (ref 3.5–5.1)
RBC # BLD: 4.29 M/UL (ref 4.2–5.9)
SODIUM BLD-SCNC: 138 MMOL/L (ref 136–145)
WBC # BLD: 9.8 K/UL (ref 4–11)

## 2022-09-18 PROCEDURE — C9113 INJ PANTOPRAZOLE SODIUM, VIA: HCPCS | Performed by: SURGERY

## 2022-09-18 PROCEDURE — APPNB30 APP NON BILLABLE TIME 0-30 MINS: Performed by: NURSE PRACTITIONER

## 2022-09-18 PROCEDURE — 99024 POSTOP FOLLOW-UP VISIT: CPT | Performed by: SURGERY

## 2022-09-18 PROCEDURE — 80048 BASIC METABOLIC PNL TOTAL CA: CPT

## 2022-09-18 PROCEDURE — 85027 COMPLETE CBC AUTOMATED: CPT

## 2022-09-18 PROCEDURE — 6360000002 HC RX W HCPCS: Performed by: SURGERY

## 2022-09-18 PROCEDURE — APPSS15 APP SPLIT SHARED TIME 0-15 MINUTES: Performed by: NURSE PRACTITIONER

## 2022-09-18 PROCEDURE — 2580000003 HC RX 258: Performed by: SURGERY

## 2022-09-18 PROCEDURE — 1200000000 HC SEMI PRIVATE

## 2022-09-18 PROCEDURE — 6370000000 HC RX 637 (ALT 250 FOR IP): Performed by: NURSE PRACTITIONER

## 2022-09-18 RX ORDER — OXYCODONE HYDROCHLORIDE 5 MG/1
10 TABLET ORAL EVERY 4 HOURS PRN
Status: DISCONTINUED | OUTPATIENT
Start: 2022-09-18 | End: 2022-09-19 | Stop reason: HOSPADM

## 2022-09-18 RX ORDER — OXYCODONE HYDROCHLORIDE 5 MG/1
5 TABLET ORAL EVERY 4 HOURS PRN
Status: DISCONTINUED | OUTPATIENT
Start: 2022-09-18 | End: 2022-09-19 | Stop reason: HOSPADM

## 2022-09-18 RX ORDER — HYDROMORPHONE HYDROCHLORIDE 1 MG/ML
0.5 INJECTION, SOLUTION INTRAMUSCULAR; INTRAVENOUS; SUBCUTANEOUS EVERY 4 HOURS PRN
Status: DISCONTINUED | OUTPATIENT
Start: 2022-09-18 | End: 2022-09-19 | Stop reason: HOSPADM

## 2022-09-18 RX ADMIN — ACETAMINOPHEN 1000 MG: 500 TABLET ORAL at 16:47

## 2022-09-18 RX ADMIN — SODIUM CHLORIDE, PRESERVATIVE FREE 10 ML: 5 INJECTION INTRAVENOUS at 20:47

## 2022-09-18 RX ADMIN — HYDROMORPHONE HYDROCHLORIDE 1 MG: 1 INJECTION, SOLUTION INTRAMUSCULAR; INTRAVENOUS; SUBCUTANEOUS at 08:40

## 2022-09-18 RX ADMIN — HYDROMORPHONE HYDROCHLORIDE 1 MG: 1 INJECTION, SOLUTION INTRAMUSCULAR; INTRAVENOUS; SUBCUTANEOUS at 11:05

## 2022-09-18 RX ADMIN — SODIUM CHLORIDE, PRESERVATIVE FREE 10 ML: 5 INJECTION INTRAVENOUS at 08:39

## 2022-09-18 RX ADMIN — ACETAMINOPHEN 1000 MG: 500 TABLET ORAL at 20:47

## 2022-09-18 RX ADMIN — PANTOPRAZOLE SODIUM 40 MG: 40 INJECTION, POWDER, FOR SOLUTION INTRAVENOUS at 08:39

## 2022-09-18 RX ADMIN — ACETAMINOPHEN 1000 MG: 500 TABLET ORAL at 05:15

## 2022-09-18 RX ADMIN — ENOXAPARIN SODIUM 40 MG: 100 INJECTION SUBCUTANEOUS at 08:39

## 2022-09-18 ASSESSMENT — PAIN DESCRIPTION - ORIENTATION
ORIENTATION: ANTERIOR

## 2022-09-18 ASSESSMENT — PAIN - FUNCTIONAL ASSESSMENT
PAIN_FUNCTIONAL_ASSESSMENT: ACTIVITIES ARE NOT PREVENTED
PAIN_FUNCTIONAL_ASSESSMENT: PREVENTS OR INTERFERES WITH ALL ACTIVE AND SOME PASSIVE ACTIVITIES

## 2022-09-18 ASSESSMENT — PAIN DESCRIPTION - PAIN TYPE
TYPE: SURGICAL PAIN

## 2022-09-18 ASSESSMENT — PAIN DESCRIPTION - DESCRIPTORS
DESCRIPTORS: SORE
DESCRIPTORS: ACHING
DESCRIPTORS: SORE
DESCRIPTORS: SORE

## 2022-09-18 ASSESSMENT — PAIN DESCRIPTION - LOCATION
LOCATION: ABDOMEN

## 2022-09-18 ASSESSMENT — PAIN DESCRIPTION - FREQUENCY
FREQUENCY: INTERMITTENT
FREQUENCY: INTERMITTENT

## 2022-09-18 ASSESSMENT — PAIN SCALES - GENERAL
PAINLEVEL_OUTOF10: 6
PAINLEVEL_OUTOF10: 8
PAINLEVEL_OUTOF10: 5
PAINLEVEL_OUTOF10: 9
PAINLEVEL_OUTOF10: 9
PAINLEVEL_OUTOF10: 7

## 2022-09-18 ASSESSMENT — PAIN DESCRIPTION - ONSET
ONSET: GRADUAL
ONSET: ON-GOING

## 2022-09-18 NOTE — PROGRESS NOTES
[facility]        Progress Note    Patient Name: Margaret Mcburney  MRN: [id number]  YOB: 1980  Date of Evaluation: 2022    Subjective:  No acute events overnight  Pain controlled  No nausea or vomiting, appetite improving, tolerating clear liquids  Passing flatus, no BM  Resting in bed at this time, ambulates without difficulty    Post-Operative Day #2      Vital Signs:  Patient Vitals for the past 24 hrs:   BP Temp Temp src Pulse Resp SpO2   22 0815 125/81 98.3 °F (36.8 °C) Oral 75 18 94 %   22 0437 135/79 98.7 °F (37.1 °C) Oral 68 18 94 %   22 0009 116/65 98.3 °F (36.8 °C) Oral 63 16 91 %   22 2115 129/83 97.6 °F (36.4 °C) Oral 60 16 96 %   22 1830 -- -- -- -- 18 --   22 1756 127/77 97.3 °F (36.3 °C) Oral 60 18 95 %   22 1657 -- -- -- -- 18 --   22 1627 -- -- -- -- 18 --   22 1446 -- -- -- -- 18 --   22 1236 139/85 98.3 °F (36.8 °C) Axillary 65 18 96 %        TEMPERATURE HISTORY 24H: Temp (24hrs), Av.1 °F (36.7 °C), Min:97.3 °F (36.3 °C), Max:98.7 °F (37.1 °C)    BLOOD PRESSURE HISTORY: Systolic (12FYW), CYZ:139 , Min:116 , WXD:134    Diastolic (11OIT), GRA:49, Min:65, Max:85      Intake/Output:  I/O last 3 completed shifts: In: 2180.7 [I.V.:2180.7]  Out: 7129 [Urine:5950]  No intake/output data recorded.   Drain/tube Output:       Physical Exam:  General: awake, alert, oriented to person, place, time  Lungs: unlabored respirations  Abdomen: soft, no distention, incisional tenderness only, bowel sounds present   Skin/Wound: healing well, no drainage, no erythema, well approximated    Labs:  CBC:    Recent Labs     22  1038 22  0416 22  0631   WBC 7.1 14.0* 9.8   HGB 15.7 13.7 13.2*   HCT 46.2 40.9 39.7*    253 218       BMP:    Recent Labs     22  1038 22  0631    138   K 4.6 4.1    104   CO2 24 26   BUN 15 8   CREATININE 0.8* 0.8* GLUCOSE 95 104*       Hepatic:  No results for input(s): AST, ALT, ALB, BILITOT, ALKPHOS in the last 72 hours. Amylase:  No results found for: AMYLASE  Lipase:    Lab Results   Component Value Date/Time    LIPASE 22.0 07/13/2021 08:03 AM        Mag:  No results found for: MG  Phos:   No results found for: PHOS   Coags: No results found for: PROTIME, INR, APTT    Cultures:  Anaerobic culture  No results found for: LABANAE  Fungus stain  No results found for requested labs within last 30 days. Gram stain  No results found for requested labs within last 30 days. Organism  No results found for: [facility]  Surgical culture  No results found for: CXSURG  Blood culture 1  No results found for requested labs within last 30 days. Blood culture 2  No results found for requested labs within last 30 days. Fecal occult  No results found for requested labs within last 30 days. GI bacterial pathogens by PCR  No results found for requested labs within last 30 days. C. difficile  No results found for requested labs within last 30 days. Urine culture  No results found for: LABURIN    Pathology:  Pathology results pending     Imaging:  I have personally reviewed the following films:    No results found. Scheduled Meds:   acetaminophen  1,000 mg Oral 3 times per day    sodium chloride flush  5-40 mL IntraVENous 2 times per day    enoxaparin  40 mg SubCUTAneous Daily    pantoprazole  40 mg IntraVENous Daily     Continuous Infusions:   sodium chloride       PRN Meds:.sodium chloride flush, sodium chloride, HYDROmorphone, ondansetron      Assessment:  40 y.o. male admitted with   1. Sigmoid diverticulitis        OR Date 9/16/2022, robotic laparoscopic sigmoid colon resection with coloproctostomy for recurrent sigmoid diverticulitis  GERD      Plan:  1. Pain controlled, no nausea/vomiting, incisions healing well, passing flatus, vitals/labs stable; continued supportive care  2.  Advance to regular diet as tolerated; monitor bowel function  3. Stop IV hydration; monitor and correct electrolytes  4. Activity as tolerated, ambulate TID, up to chair for all meals  5. Pulmonary toilet, incentive spirometry  6. PRN analgesics and antiemetics--minimizing narcotics as tolerated, transition to PO  7. DVT prophylaxis with  Lovenox and SCD's  8. Management of medical comorbid etiologies, PPI resumed for GERD  9. Disposition: Anticipate discharge home tomorrow    EDUCATION:  Educated patient on plan of care and disease process--all questions answered. Plans discussed with patient and nursing. Reviewed and discussed with Dr. Sienna Sierra. Signed:  JAGRUTI Jarrell - CNP  9/18/2022 11:20 AM    Doing well  Tolerating clear liquid  Passing flatus  Ambulating well  Will advance to general diet  Hep-Lock IV  Possible discharge in a.m.

## 2022-09-18 NOTE — PLAN OF CARE
Shift assessment complete. VSS. Requested and received PRN for pain. Pt ambulated this shift independently in room and on the floor. Pt reports no issues with voiding since oropeza removed. No BM, passing flatus. Standard safety measures in place. Call light in reach. The care plan and education has been reviewed and mutually agreed upon with the patient. Patient remains free from falls. All fall precautions in place. Yellow bracelet on patient. SAFE sign on door. Bed and chair alarms being used. Bed in lowest position.  Will monitor.       -----------------------  Problem: Discharge Planning  Goal: Discharge to home or other facility with appropriate resources  Outcome: Progressing     Problem: Pain  Goal: Verbalizes/displays adequate comfort level or baseline comfort level  Outcome: Progressing     Problem: ABCDS Injury Assessment  Goal: Absence of physical injury  Outcome: Progressing     Problem: Skin/Tissue Integrity - Adult  Goal: Skin integrity remains intact  Outcome: Progressing  Goal: Incisions, wounds, or drain sites healing without S/S of infection  Outcome: Progressing     Problem: Gastrointestinal - Adult  Goal: Minimal or absence of nausea and vomiting  Outcome: Progressing  Goal: Maintains or returns to baseline bowel function  Outcome: Progressing  Goal: Maintains adequate nutritional intake  Outcome: Progressing     Problem: Genitourinary - Adult  Goal: Absence of urinary retention  Outcome: Progressing

## 2022-09-19 VITALS
TEMPERATURE: 98 F | SYSTOLIC BLOOD PRESSURE: 132 MMHG | HEIGHT: 68 IN | BODY MASS INDEX: 30.77 KG/M2 | HEART RATE: 60 BPM | WEIGHT: 203 LBS | DIASTOLIC BLOOD PRESSURE: 84 MMHG | OXYGEN SATURATION: 99 % | RESPIRATION RATE: 16 BRPM

## 2022-09-19 PROCEDURE — APPSS30 APP SPLIT SHARED TIME 16-30 MINUTES: Performed by: NURSE PRACTITIONER

## 2022-09-19 PROCEDURE — 6370000000 HC RX 637 (ALT 250 FOR IP): Performed by: NURSE PRACTITIONER

## 2022-09-19 PROCEDURE — APPNB30 APP NON BILLABLE TIME 0-30 MINS: Performed by: NURSE PRACTITIONER

## 2022-09-19 PROCEDURE — 6360000002 HC RX W HCPCS: Performed by: SURGERY

## 2022-09-19 PROCEDURE — C9113 INJ PANTOPRAZOLE SODIUM, VIA: HCPCS | Performed by: SURGERY

## 2022-09-19 PROCEDURE — 2580000003 HC RX 258: Performed by: SURGERY

## 2022-09-19 RX ORDER — HYDROCODONE BITARTRATE AND ACETAMINOPHEN 5; 325 MG/1; MG/1
1 TABLET ORAL EVERY 6 HOURS PRN
Qty: 10 TABLET | Refills: 0 | Status: SHIPPED | OUTPATIENT
Start: 2022-09-19 | End: 2022-09-26

## 2022-09-19 RX ADMIN — PANTOPRAZOLE SODIUM 40 MG: 40 INJECTION, POWDER, FOR SOLUTION INTRAVENOUS at 10:08

## 2022-09-19 RX ADMIN — ENOXAPARIN SODIUM 40 MG: 100 INJECTION SUBCUTANEOUS at 10:08

## 2022-09-19 RX ADMIN — SODIUM CHLORIDE, PRESERVATIVE FREE 10 ML: 5 INJECTION INTRAVENOUS at 10:08

## 2022-09-19 RX ADMIN — ACETAMINOPHEN 1000 MG: 500 TABLET ORAL at 05:27

## 2022-09-19 ASSESSMENT — PAIN DESCRIPTION - ORIENTATION: ORIENTATION: RIGHT;LEFT;MID

## 2022-09-19 ASSESSMENT — PAIN DESCRIPTION - PAIN TYPE: TYPE: SURGICAL PAIN

## 2022-09-19 ASSESSMENT — PAIN DESCRIPTION - LOCATION: LOCATION: ABDOMEN

## 2022-09-19 ASSESSMENT — PAIN DESCRIPTION - FREQUENCY: FREQUENCY: CONTINUOUS

## 2022-09-19 ASSESSMENT — PAIN SCALES - GENERAL: PAINLEVEL_OUTOF10: 5

## 2022-09-19 ASSESSMENT — PAIN DESCRIPTION - DESCRIPTORS: DESCRIPTORS: ACHING;SORE

## 2022-09-19 NOTE — DISCHARGE SUMMARY
[facility]        Discharge Summary    Patient Name: Beecher Kehr  MRN: [id number]  YOB: 1980  PCP: Meryle Flight, MD  Admission Date: 9/16/2022  Discharge Date: 9/19/2022  Disposition: home  Admitting Diagnosis: Sigmoid diverticulitis [K57.32]  Discharge Diagnosis:   Patient Active Problem List   Diagnosis    Skin lesion    Sigmoid diverticulitis      Consultants: None    Procedures/Diagnostic Test(s):  No orders to display       Discharge Condition: good    HOSPITAL COURSE: Tyrell Florence originally presented to the hospital on 9/16/2022  9:59 AM with a history of recurrent episodes of sigmoid diverticulitis. He was brought in on the above date for elective sigmoid colon resection. He was taken to the operating room and underwent robotic laparoscopic sigmoid colon resection with coloproctostomy. Hospital course was uneventful. Surgical pathology was pending at the time of discharge. At time of discharge, Tyrell Florence was tolerating a regular diet, had active bowel sounds, ambulating on his own accord and had adequate analgesia on oral pain medications, and had no signs of symptoms of complications. PHYSICAL EXAMINATION:  Discharge Vitals:  height is 5' 8\" (1.727 m) and weight is 203 lb (92.1 kg). His oral temperature is 98 °F (36.7 °C). His blood pressure is 132/84 and his pulse is 60. His respiration is 16 and oxygen saturation is 99%.    General appearance - alert, well appearing, and in no distress  Chest - clear to ausculation  Heart - normal rate and regular rhythm  Abdomen - soft, minimally distended, incisional tenderness only, bowel sounds present  Neurological - motor and sensory grossly normal bilaterally  Musculoskeletal - full range of motion without pain  Extremities - peripheral pulses normal, no pedal edema, no clubbing or cyanosis  Incision: healing well, no drainage, no erythema, well approximated    LABS:  Recent Labs     09/17/22  8534 09/18/22  0631   WBC 14.0* 9.8   HGB 13.7 13.2*   HCT 40.9 39.7*    218   NA  --  138   K  --  4.1   CL  --  104   CO2  --  26   BUN  --  8   CREATININE  --  0.8*       DISCHARGE INSTRUCTIONS:  1. Discharge medications:      Medication List        START taking these medications      HYDROcodone-acetaminophen 5-325 MG per tablet  Commonly known as: Norco  Take 1 tablet by mouth every 6 hours as needed for Pain for up to 7 days. Take lowest dose possible to manage pain; may stop taking sooner than 7 days if pain is able to be controlled with non-narcotic analgesics and therapies. CONTINUE taking these medications      omeprazole 10 MG delayed release capsule  Commonly known as: PRILOSEC               Where to Get Your Medications        You can get these medications from any pharmacy    Bring a paper prescription for each of these medications  HYDROcodone-acetaminophen 5-325 MG per tablet       2. Diet as tolerated. 3. Activity: activity as tolerated, no driving while on analgesics, and no heavy lifting for 6 weeks. 4. Wound care: keep incisions clean and dry  5. Follow-up: recommend follow up with PCP in 2-4 weeks. 6. Okay to shower, don't submerge incisions under water. 7. No driving until off pain medication and able to move torso freely without any pain. 8. Return to hospital, or contact Dr. Rivas Morris office ([phone]) if any signs of infection are seen. 9. The patient is not currently smoking. Recommend maintaining a smoke-free lifestyle. 10. Return to Clinic: in 2 weeks. 11. Reviewed discharge instructions, restrictions, and reasons to call the office. EDUCATION:  Educated patient on plan of care and disease process--all questions answered. Plans discussed with patient and nursing. Reviewed and discussed with Dr. Amanda Cooper. Time spent for discharge: 30 minutes, including plan of care, patient education, and care coordination.       Signed:  JAGRUTI De La Cruz CNP  9/19/2022 2:26 PM

## 2022-09-19 NOTE — PLAN OF CARE
Hourly rounding completed throughout shift. End Of Shift Functional Summary, Nursing      TOILETING:  Does patient need assist with clothing management and/or pericare? No    TOILET TRANSFER:  Pt requires moderate assistance. Pt uses walker. BLADDER:  Pt does not have a castillo catheter that staff manages. Pt does not take medication. Pt is continent. of bladder and voids in toilet  Pt requires staff to empty device Pt has had 0 bladder accidents during this shift requiring moderate assistance to clean up. (An accident is when the episode is not contained in a brief AND/OR the clothing/linen requires changing/cleaning up.)    BOWEL:  Pt does take medication. Pt is continent of bowel and uses toilet. Pt requires staff to change brief    Pt has had 0 bowel accidents during this shift requiring moderate assistance from staff to clean up. (An accident is when the episode is not contained in a brief AND/OR the clothing/linen requires changing/cleaning up.)    BED/CHAIR TRANSFER  Pt requires moderate assistance. Patient requires the assistance of 1   staff member(s). Pt uses walker      GROOMING  Pt requires setup of supplies with oral care. Documentation reviewed and plan of care discussed/reviewed with   patient during the shift. Problem: Discharge Planning  Goal: Discharge to home or other facility with appropriate resources  9/19/2022 1302 by Rosemary Garcia RN  Outcome: Progressing  9/19/2022 0043 by Shawnee Mora RN  Outcome: Progressing     Problem: Pain  Goal: Verbalizes/displays adequate comfort level or baseline comfort level  9/19/2022 1302 by Rosemary Garcia RN  Outcome: Progressing  9/19/2022 0043 by Shawnee Mora RN  Outcome: Progressing     Problem: ABCDS Injury Assessment  Goal: Absence of physical injury  9/19/2022 1302 by Rosemary Garcia RN  Outcome: Progressing  9/19/2022 0043 by Shawnee Mora RN  Outcome: Progressing     Problem: Skin/Tissue Integrity - Adult  Goal: Skin integrity remains intact  9/19/2022 1302 by Rosemary Garcia RN  Outcome: Progressing  9/19/2022 0043 by Shawnee Mora RN  Outcome: Progressing  Goal: Incisions, wounds, or drain sites healing without S/S of infection  9/19/2022 1302 by Rosemary Garcia RN  Outcome: Progressing  9/19/2022 0043 by Shawnee Mora RN  Outcome: Progressing     Problem: Gastrointestinal - Adult  Goal: Minimal or absence of nausea and vomiting  9/19/2022 1302 by Rosemary Garcia RN  Outcome: Progressing  9/19/2022 0043 by Shawnee Mora RN  Outcome: Progressing  Goal: Maintains or returns to baseline bowel function  9/19/2022 1302 by Rosemary Garcia RN  Outcome: Progressing  9/19/2022 0043 by Shawnee Mora RN  Outcome: Progressing  Goal: Maintains adequate nutritional intake  9/19/2022 1302 by Rosemary Garcia RN  Outcome: Progressing  9/19/2022 0043 by Shawnee Mora RN  Outcome: Progressing     Problem: Genitourinary - Adult  Goal: Absence of urinary retention  9/19/2022 1302 by Rosemary Garcia RN  Outcome: Progressing  9/19/2022 0043 by Shawnee Mora RN  Outcome: Progressing     Problem: Safety - Adult  Goal: Free from fall injury  9/19/2022 1302 by Roesmary Garcia RN  Outcome: Progressing  9/19/2022 0043 by Abdulaziz Warner, RN  Outcome: Progressing

## 2022-09-19 NOTE — PLAN OF CARE
Shift assessment complete. VSS. Medications administered per order. Pt ambulating in room independently. No PRNs overnight. No BM, passing flatus. Call light in reach. The care plan and education has been reviewed and mutually agreed upon with the patient. Patient remains free from falls. All fall precautions in place. Yellow bracelet on patient. SAFE sign on door. Bed and chair alarms being used. Bed in lowest position.  Will monitor.     ------------------------------------------------  Problem: Discharge Planning  Goal: Discharge to home or other facility with appropriate resources  Outcome: Progressing     Problem: Pain  Goal: Verbalizes/displays adequate comfort level or baseline comfort level  Outcome: Progressing     Problem: ABCDS Injury Assessment  Goal: Absence of physical injury  Outcome: Progressing     Problem: Skin/Tissue Integrity - Adult  Goal: Skin integrity remains intact  Outcome: Progressing  Goal: Incisions, wounds, or drain sites healing without S/S of infection  Outcome: Progressing     Problem: Gastrointestinal - Adult  Goal: Minimal or absence of nausea and vomiting  Outcome: Progressing  Goal: Maintains or returns to baseline bowel function  Outcome: Progressing  Goal: Maintains adequate nutritional intake  Outcome: Progressing     Problem: Genitourinary - Adult  Goal: Absence of urinary retention  Outcome: Progressing     Problem: Safety - Adult  Goal: Free from fall injury  Outcome: Progressing

## 2022-09-19 NOTE — PLAN OF CARE
Problem: Discharge Planning  Goal: Discharge to home or other facility with appropriate resources  9/19/2022 1505 by Chante Ga RN  Outcome: Completed  9/19/2022 1302 by Chante Ga RN  Outcome: Progressing     Problem: Pain  Goal: Verbalizes/displays adequate comfort level or baseline comfort level  9/19/2022 1505 by Chante Ga RN  Outcome: Completed  9/19/2022 1302 by Chante Ga RN  Outcome: Progressing     Problem: ABCDS Injury Assessment  Goal: Absence of physical injury  9/19/2022 1505 by Chante Ga RN  Outcome: Completed  9/19/2022 1302 by Chante Ga RN  Outcome: Progressing     Problem: Skin/Tissue Integrity - Adult  Goal: Skin integrity remains intact  9/19/2022 1505 by Chante Ga RN  Outcome: Completed  9/19/2022 1302 by Chante Ga RN  Outcome: Progressing  Goal: Incisions, wounds, or drain sites healing without S/S of infection  9/19/2022 1505 by Chante Ga RN  Outcome: Completed  9/19/2022 1302 by Chante Ga RN  Outcome: Progressing     Problem: Gastrointestinal - Adult  Goal: Minimal or absence of nausea and vomiting  9/19/2022 1505 by Chante Ga RN  Outcome: Completed  9/19/2022 1302 by Chante Ga RN  Outcome: Progressing  Goal: Maintains or returns to baseline bowel function  9/19/2022 1505 by Chante Ga RN  Outcome: Completed  9/19/2022 1302 by Chante Ga RN  Outcome: Progressing  Goal: Maintains adequate nutritional intake  9/19/2022 1505 by Chante Ga RN  Outcome: Completed  9/19/2022 1302 by Chante Ga RN  Outcome: Progressing     Problem: Genitourinary - Adult  Goal: Absence of urinary retention  9/19/2022 1505 by Chante Ga RN  Outcome: Completed  9/19/2022 1302 by Chante Ga RN  Outcome: Progressing     Problem: Safety - Adult  Goal: Free from fall injury  9/19/2022 1505 by Chante Ga RN  Outcome: Completed  9/19/2022 1302 by Chante Ga RN  Outcome: Progressing

## 2022-09-19 NOTE — CARE COORDINATION
Discharge Planning Note:    Chart reviewed and it appears that patient has minimal needs for discharge at this time. Discussed with patients nurse and requested that case management be notified if discharge needs are identified.     - Current discharge plan is for the patient to return home with no needs. Case management will continue to follow progress and update discharge plan as needed.       Risk of Readmission Score: 3%    ARINA Navarro RN    [facility]  Phone: [phone]

## 2022-09-19 NOTE — DISCHARGE INSTRUCTIONS
[facility]    Discharge Instructions      Activity  - activity as tolerated, no driving while on analgesics, and no heavy lifting for 6 weeks; it is OK to be up walking around--walking up and down stairs is also OK. Do what is comfortable: stop and rest if you feel tired. Diet  - regular diet  - Drink plenty of fluids     Pain  - You may use narcotic pain medication as prescribed for breakthrough pain  - You can use OTC Tylenol or Ibuprofen for 1-2 weeks (may need to adjust the dose as directed on the bottle if enteric coated ibuprofen chosen)  - Avoid taking narcotic pain medication on an empty stomach which may result in nausea, if pain medication is causing nausea try decreasing the dose  - Narcotic pain medication is not necessary, please contact the office if pain is not controlled  - Narcotic pain medication will not be refilled on weekends and after hours  - Please contact the office Monday-Friday 8a-4p if a refill is requested    Nausea  - Avoid taking narcotic pain medication on an empty stomach which may result in nausea  - If pain medication is causing nausea you may try decreasing the dose  - Nausea can be common for 24 hours after surgery--if nausea uncontrolled or worsening, contact the office or go to the ED    Constipation  - Pain medication can be constipating  - Often, it helps to take a stool softener with the goal of at least one soft bowel movement daily with minimal straining  - You may also need to increase water and fiber intake--may supplement with Benefiber and increasing your activity will also be helpful  - If a stool softener is needed, the following OTC medications are recommend: Colace 100 mg by mouth twice daily, Miralax 17 gm by mouth 1-3 times daily with glass of water, dulcolax suppositories, and Fleets enemas    Wound Care  - keep wound clean and dry  - If incisional bleeding is noted, hold firm pressure for 15-20 minutes.  If remains uncontrolled, either contact the office or present to nearest ED  - It is common to have bruising or mild redness around the wounds within the first few days after surgery. If it worsens, or starts draining, do not hesitate to contact the office  - May shower but no tub baths or swimming pools--do not submerge incisions under water    Cautions  - Watch for signs of infection, such as: fever over 100.5, excessive warmth or redness around incisions, bloody or cloudy drainage from incisions  - Watch for signs of complication: uncontrolled pain, nausea, bloating, sudden lightheadedness  - Serious problems can arise after surgery that need urgent or immediate care  - Do not hesitate to contact the office with any questions    Follow-up with your surgeon in  2 weeks. Call [phone] to schedule follow-up visit.

## 2022-09-19 NOTE — PROGRESS NOTES
Shift assessment completed, VSS, pt A&O, in bed, c/o pain 5/10 to abd. Morning med administered per MAR, incision to abd CDI, POC and education reviewed with pt and mutually agreed upon, all needs met at this time, call light within reach, will continue to monitor. 1500: Discharge paperwork given with instructions and answered all the questions. 1515: Pt discharged via w/c, spouse at side.

## 2022-09-19 NOTE — PROGRESS NOTES
CLINICAL PHARMACY NOTE: MEDS TO BEDS    Total # of Prescriptions Filled: 1   The following medications were delivered to the patient:  Norco 5-325 mg    Additional Documentation:  Patient wife picked up from Outpatient Pharmacy=Signed  Kristi Medeiros CPhT

## 2022-09-30 ENCOUNTER — OFFICE VISIT (OUTPATIENT)
Dept: SURGERY | Age: 42
End: 2022-09-30

## 2022-09-30 VITALS — DIASTOLIC BLOOD PRESSURE: 82 MMHG | SYSTOLIC BLOOD PRESSURE: 120 MMHG | WEIGHT: 200 LBS | BODY MASS INDEX: 30.41 KG/M2

## 2022-09-30 DIAGNOSIS — K57.32 SIGMOID DIVERTICULITIS: Primary | ICD-10-CM

## 2022-09-30 PROCEDURE — 99024 POSTOP FOLLOW-UP VISIT: CPT | Performed by: SURGERY

## 2022-09-30 NOTE — LETTER
[facility]  [address] [address]  Phone: [phone]  Fax: [phone]    September 30, 2022    Patient: Beecher Kehr  MRN:  [id number]  YOB: 1980  Date of Visit: 9/30/2022    Dear Dr Daniel ref. provider found: Thank you for the request for consultation for [facility]. Below are the relevant portions of my assessment and plan of care. Assessment:  16-year-old male status post laparoscopic sigmoid colon resection for diverticular disease. Doing well postoperatively. Plan:  Continue lifting restrictions for 4 more weeks. Follow-up as needed. If you have questions, please do not hesitate to call me.      Sincerely,    Trent Stock MD    CC providers:    Meryle Flight, MD  [facility] [id number]  [facility] [address]
03-May-2017

## 2022-09-30 NOTE — PROGRESS NOTES
Subjective:      Patient ID: Lizzeth Jackman is a 40 y.o. male. HPI    Review of Systems    Objective:   Physical Exam  Abdomen soft  Incisions healing well  Assessment:      45-year-old male status post laparoscopic sigmoid colon resection for diverticular disease. Doing well postoperatively. Plan:      Continue lifting restrictions for 4 more weeks. Follow-up as needed.         Hal Mansfield MD

## 2024-02-22 ASSESSMENT — PATIENT HEALTH QUESTIONNAIRE - PHQ9
1. LITTLE INTEREST OR PLEASURE IN DOING THINGS: 0
SUM OF ALL RESPONSES TO PHQ QUESTIONS 1-9: 0
SUM OF ALL RESPONSES TO PHQ9 QUESTIONS 1 & 2: 0
2. FEELING DOWN, DEPRESSED OR HOPELESS: 0
SUM OF ALL RESPONSES TO PHQ9 QUESTIONS 1 & 2: 0
SUM OF ALL RESPONSES TO PHQ QUESTIONS 1-9: 0
1. LITTLE INTEREST OR PLEASURE IN DOING THINGS: NOT AT ALL
SUM OF ALL RESPONSES TO PHQ QUESTIONS 1-9: 0
SUM OF ALL RESPONSES TO PHQ QUESTIONS 1-9: 0
2. FEELING DOWN, DEPRESSED OR HOPELESS: NOT AT ALL

## 2024-02-26 ENCOUNTER — OFFICE VISIT (OUTPATIENT)
Dept: FAMILY MEDICINE CLINIC | Age: 44
End: 2024-02-26
Payer: COMMERCIAL

## 2024-02-26 VITALS
OXYGEN SATURATION: 98 % | BODY MASS INDEX: 34.86 KG/M2 | HEART RATE: 91 BPM | SYSTOLIC BLOOD PRESSURE: 138 MMHG | HEIGHT: 68 IN | DIASTOLIC BLOOD PRESSURE: 76 MMHG | WEIGHT: 230 LBS

## 2024-02-26 DIAGNOSIS — Z13.220 SCREENING, LIPID: ICD-10-CM

## 2024-02-26 DIAGNOSIS — Z13.1 SCREENING FOR DIABETES MELLITUS (DM): ICD-10-CM

## 2024-02-26 DIAGNOSIS — Z00.00 ENCOUNTER FOR WELL ADULT EXAM WITHOUT ABNORMAL FINDINGS: ICD-10-CM

## 2024-02-26 DIAGNOSIS — Z23 NEED FOR TDAP VACCINATION: Primary | ICD-10-CM

## 2024-02-26 DIAGNOSIS — Z71.89 ACP (ADVANCE CARE PLANNING): ICD-10-CM

## 2024-02-26 DIAGNOSIS — R00.2 PALPITATIONS: ICD-10-CM

## 2024-02-26 PROCEDURE — 99214 OFFICE O/P EST MOD 30 MIN: CPT | Performed by: FAMILY MEDICINE

## 2024-02-26 PROCEDURE — 90471 IMMUNIZATION ADMIN: CPT | Performed by: FAMILY MEDICINE

## 2024-02-26 PROCEDURE — 99396 PREV VISIT EST AGE 40-64: CPT | Performed by: FAMILY MEDICINE

## 2024-02-26 PROCEDURE — 90715 TDAP VACCINE 7 YRS/> IM: CPT | Performed by: FAMILY MEDICINE

## 2024-02-26 RX ORDER — FOLIC ACID/MULTIVIT,IRON,MINER .4-18-35
1 TABLET,CHEWABLE ORAL DAILY
COMMUNITY

## 2024-02-26 RX ORDER — MAGNESIUM 30 MG
30 TABLET ORAL 2 TIMES DAILY
COMMUNITY

## 2024-02-26 RX ORDER — GLUCOSAMINE HCL 500 MG
TABLET ORAL
COMMUNITY

## 2024-02-26 RX ORDER — CYANOCOBALAMIN (VITAMIN B-12) 500 MCG
TABLET ORAL
COMMUNITY

## 2024-02-26 NOTE — PATIENT INSTRUCTIONS
Advance Care Planning     Advance Care Planning opens a door to talk about and write down your wishes before a sudden accident or illness.  Make your goals, values, and preferences known.     This puts you in the ’s seat and helps others know what matters most to you so they won’t have to guess.      Where can you learn more?    Go to [email or web address]-resources/[email or web address]-care-planning   to learn how to:    Name someone you trust to make healthcare decisions for you, only if you can’t. (Healthcare Power of )    Document your wishes for care if you were seriously ill and not expected to recover or are approaching end of life. (Advance Directive or Living Will)    The same page can be found using the QR code below.                Starting a Weight Loss Plan: Care Instructions  Overview     If you're thinking about losing weight, it can be hard to know where to start. Your doctor can help you set up a weight loss plan that best meets your needs. You may want to take a class on nutrition or exercise, or you could join a weight loss support group. If you have questions about how to make changes to your eating or exercise habits, ask your doctor about seeing a registered dietitian or an exercise specialist.  It can be a big challenge to lose weight. But you don't have to make huge changes at once. Make small changes, and stick with them. When those changes become habit, add a few more changes.  If you don't think you're ready to make changes right now, try to pick a date in the future. Make an appointment to see your doctor to discuss whether the time is right for you to start a plan.  Follow-up care is a key part of your treatment and safety. Be sure to make and go to all appointments, and call your doctor if you are having problems. It's also a good idea to know your test results and keep a list of the medicines you take.  How can you care for yourself at home?  Set realistic goals. Many people

## 2024-02-26 NOTE — PROGRESS NOTES
Well Adult Note  Name: Luis Enrique Smith Today’s Date: 2024   MRN: [id number] Sex: Male   Age: 43 y.o. Ethnicity: Non- / Non    : 1980 Race: White (non-)      Luis Enrique Smith is here for well adult exam.  History:  43-year-old male presents for complete physical exam today.  He has been having significant amount of palpitations recently.  These are intermittent in nature.  They do occur usually every other day and last for few minutes.  Patient notes no significant chest pain associated with this.  Has had no nausea or diaphoresis.  No other findings.  Energy levels been normal.      Review of Systems   Constitutional:  Negative for fatigue and unexpected weight change.   HENT:  Negative for congestion, rhinorrhea and trouble swallowing.    Eyes:  Negative for pain and visual disturbance.   Respiratory:  Negative for chest tightness and shortness of breath.    Cardiovascular:  Negative for chest pain and palpitations.   Gastrointestinal:  Negative for constipation and diarrhea.   Endocrine: Negative for cold intolerance and heat intolerance.   Genitourinary:  Negative for frequency and urgency.   Musculoskeletal:  Negative for arthralgias and back pain.   Skin:  Negative for color change and rash.   Allergic/Immunologic: Negative for environmental allergies and food allergies.   Neurological:  Negative for dizziness and light-headedness.   Hematological:  Negative for adenopathy. Does not bruise/bleed easily.   Psychiatric/Behavioral:  Negative for dysphoric mood and sleep disturbance.        Allergies   Allergen Reactions    Penicillins Rash         Prior to Visit Medications    Medication Sig Taking? Authorizing Provider   multivitamin-iron-minerals-folic acid (CENTRUM) chewable tablet Take 1 tablet by mouth daily Yes Moshe Linares MD   Cholecalciferol (VITAMIN D3) 75 MCG (3000 UT) TABS Take by mouth Yes Moshe Linares MD   Omega-3 Fatty Acids (FISH OIL) 300 MG

## 2024-03-07 ASSESSMENT — ENCOUNTER SYMPTOMS
COLOR CHANGE: 0
CHEST TIGHTNESS: 0
CONSTIPATION: 0
DIARRHEA: 0
SHORTNESS OF BREATH: 0
TROUBLE SWALLOWING: 0
BACK PAIN: 0
RHINORRHEA: 0
EYE PAIN: 0

## 2025-03-21 ENCOUNTER — OFFICE VISIT (OUTPATIENT)
Dept: PRIMARY CARE CLINIC | Age: 45
End: 2025-03-21

## 2025-03-21 VITALS
OXYGEN SATURATION: 99 % | HEART RATE: 70 BPM | DIASTOLIC BLOOD PRESSURE: 76 MMHG | HEIGHT: 68 IN | SYSTOLIC BLOOD PRESSURE: 132 MMHG | BODY MASS INDEX: 30.92 KG/M2 | WEIGHT: 204 LBS

## 2025-03-21 DIAGNOSIS — Z00.00 HEALTHCARE MAINTENANCE: ICD-10-CM

## 2025-03-21 DIAGNOSIS — Z00.00 ANNUAL PHYSICAL EXAM: ICD-10-CM

## 2025-03-21 DIAGNOSIS — Z00.00 ANNUAL PHYSICAL EXAM: Primary | ICD-10-CM

## 2025-03-21 SDOH — ECONOMIC STABILITY: FOOD INSECURITY: WITHIN THE PAST 12 MONTHS, YOU WORRIED THAT YOUR FOOD WOULD RUN OUT BEFORE YOU GOT MONEY TO BUY MORE.: NEVER TRUE

## 2025-03-21 SDOH — ECONOMIC STABILITY: FOOD INSECURITY: WITHIN THE PAST 12 MONTHS, THE FOOD YOU BOUGHT JUST DIDN'T LAST AND YOU DIDN'T HAVE MONEY TO GET MORE.: NEVER TRUE

## 2025-03-21 ASSESSMENT — PATIENT HEALTH QUESTIONNAIRE - PHQ9
1. LITTLE INTEREST OR PLEASURE IN DOING THINGS: NOT AT ALL
SUM OF ALL RESPONSES TO PHQ QUESTIONS 1-9: 0
SUM OF ALL RESPONSES TO PHQ QUESTIONS 1-9: 0
2. FEELING DOWN, DEPRESSED OR HOPELESS: NOT AT ALL
SUM OF ALL RESPONSES TO PHQ QUESTIONS 1-9: 0
SUM OF ALL RESPONSES TO PHQ QUESTIONS 1-9: 0

## 2025-03-21 NOTE — PROGRESS NOTES
[facility]                               [facility]                                             [address], [address], [place] [address]        Phone: [phone]      Date of Service:  3/21/2025  Patient ID: Luis Enrique Smith is a 44 y.o. male    ASSESSMENT / PLAN:       Annual physical exam.  Records reviewed, chart updated, medication reconciliation performed.  Counseling and anticipatory guidance provided to patient in encounter, please see subjective portion below for more details.  Healthcare maintenance.   Will check the following annual labs for surveillance and follow-up results: CBC CMP A1C Hep C HIV lipids      Other problems addressed during this encounter as part of preventative as well as problem focused visit:  Chest pressure. Sx are controlled. Return to our office if they come back or worsen or if they begin to occur with exertion. Go to the ER if symptoms become unrelenting as this could be a sign of acute coronary syndrome.         Follow up plan:   Return in about 1 year (around 3/21/2026) for annual physical.     Surya Sandy DO  Date of encounter: 3/21/2025    SUBJECTIVE:   Luis Enrique Smith is a 44 y.o. male who  has a past medical history of GERD (gastroesophageal reflux disease).     Chief complaint: Annual Exam        Annual physical exam.  Patient here for wellness check as well as annual physical exam, discussed preventive health with patient during this encounter, see details on counseling and anticipatory guidance as well as lifestyle and risk factor reduction below.  Healthcare maintenance.  Discussed preventive health including vaccinations, cancer screenings, other screening tests, as well as annual labs and their importance as part of maintaining patient's good overall health and reduce the risk of serious infections, cancers, other acute or chronic diseases.  Annual labs due

## 2025-03-22 LAB
ALBUMIN SERPL-MCNC: 5 G/DL (ref 3.4–5)
ALBUMIN/GLOB SERPL: 1.9 {RATIO} (ref 1.1–2.2)
ALP SERPL-CCNC: 61 U/L (ref 40–129)
ALT SERPL-CCNC: 55 U/L (ref 10–40)
ANION GAP SERPL CALCULATED.3IONS-SCNC: 14 MMOL/L (ref 3–16)
AST SERPL-CCNC: 28 U/L (ref 15–37)
BASOPHILS # BLD: 0.1 K/UL (ref 0–0.2)
BASOPHILS NFR BLD: 0.9 %
BILIRUB SERPL-MCNC: 0.6 MG/DL (ref 0–1)
BUN SERPL-MCNC: 17 MG/DL (ref 7–20)
CALCIUM SERPL-MCNC: 10.4 MG/DL (ref 8.3–10.6)
CHLORIDE SERPL-SCNC: 103 MMOL/L (ref 99–110)
CHOLEST SERPL-MCNC: 140 MG/DL (ref 0–199)
CO2 SERPL-SCNC: 25 MMOL/L (ref 21–32)
CREAT SERPL-MCNC: 0.8 MG/DL (ref 0.9–1.3)
DEPRECATED RDW RBC AUTO: 12.7 % (ref 12.4–15.4)
EOSINOPHIL # BLD: 0.1 K/UL (ref 0–0.6)
EOSINOPHIL NFR BLD: 1.1 %
EST. AVERAGE GLUCOSE BLD GHB EST-MCNC: 105.4 MG/DL
GFR SERPLBLD CREATININE-BSD FMLA CKD-EPI: >90 ML/MIN/{1.73_M2}
GLUCOSE SERPL-MCNC: 72 MG/DL (ref 70–99)
HBA1C MFR BLD: 5.3 %
HCT VFR BLD AUTO: 46.8 % (ref 40.5–52.5)
HCV AB SERPL QL IA: NORMAL
HDLC SERPL-MCNC: 33 MG/DL (ref 40–60)
HGB BLD-MCNC: 16 G/DL (ref 13.5–17.5)
HIV 1+2 AB+HIV1 P24 AG SERPL QL IA: NORMAL
HIV 2 AB SERPL QL IA: NORMAL
HIV1 AB SERPL QL IA: NORMAL
HIV1 P24 AG SERPL QL IA: NORMAL
LDLC SERPL CALC-MCNC: 94 MG/DL
LYMPHOCYTES # BLD: 3.5 K/UL (ref 1–5.1)
LYMPHOCYTES NFR BLD: 41.4 %
MCH RBC QN AUTO: 31.9 PG (ref 26–34)
MCHC RBC AUTO-ENTMCNC: 34.2 G/DL (ref 31–36)
MCV RBC AUTO: 93.2 FL (ref 80–100)
MONOCYTES # BLD: 0.5 K/UL (ref 0–1.3)
MONOCYTES NFR BLD: 5.4 %
NEUTROPHILS # BLD: 4.3 K/UL (ref 1.7–7.7)
NEUTROPHILS NFR BLD: 51.2 %
PLATELET # BLD AUTO: 253 K/UL (ref 135–450)
PMV BLD AUTO: 9.2 FL (ref 5–10.5)
POTASSIUM SERPL-SCNC: 4.9 MMOL/L (ref 3.5–5.1)
PROT SERPL-MCNC: 7.6 G/DL (ref 6.4–8.2)
RBC # BLD AUTO: 5.02 M/UL (ref 4.2–5.9)
SODIUM SERPL-SCNC: 142 MMOL/L (ref 136–145)
TRIGL SERPL-MCNC: 65 MG/DL (ref 0–150)
VLDLC SERPL CALC-MCNC: 13 MG/DL
WBC # BLD AUTO: 8.5 K/UL (ref 4–11)

## 2025-03-23 ENCOUNTER — RESULTS FOLLOW-UP (OUTPATIENT)
Dept: PRIMARY CARE CLINIC | Age: 45
End: 2025-03-23

## (undated) DEVICE — PENCIL ES L3M BTTN SWCH S STL HEX LOK BLDE ELECTRD HOLSTER

## (undated) DEVICE — GAUZE,SPONGE,4"X4",8PLY,STRL,LF,10/TRAY: Brand: MEDLINE

## (undated) DEVICE — STERILE POLYISOPRENE POWDER-FREE SURGICAL GLOVES: Brand: PROTEXIS

## (undated) DEVICE — TRI-LUMEN FILTERED TUBE SET WITH ACTIVATED CHARCOAL FILTER: Brand: AIRSEAL

## (undated) DEVICE — BLANKET WRM W29.9XL79.1IN UP BODY FORC AIR MISTRAL-AIR

## (undated) DEVICE — PENCIL SMK EVAC 10 FT BLADE ELECTRD ROCKER FOR TELSCP

## (undated) DEVICE — TIP-UP FENESTRATED GRASPER: Brand: ENDOWRIST

## (undated) DEVICE — STAPLER INT DIA29MM CLS STPL H1.5-2.2MM OPN LEG L5.2MM 26

## (undated) DEVICE — ROBOTIC PK

## (undated) DEVICE — SEAL

## (undated) DEVICE — SUTURE VCRL SZ 0 L36IN ABSRB UD CT-1 L36MM 1/2 CIR TAPR PNT VCP946H

## (undated) DEVICE — SUTURE VCRL + SZ 2-0 L18IN ABSRB CLR TIE POLYGLACTIN 910 VCP111G

## (undated) DEVICE — ADHESIVE SKIN CLSR 0.7ML TOP DERMBND ADV

## (undated) DEVICE — SYRINGE IRRIG 60ML SFT PLIABLE BLB EZ TO GRP 1 HND USE W/

## (undated) DEVICE — PROTECTOR EYE PT SELF ADH NS OPT GRD LF

## (undated) DEVICE — COVER LT HNDL BLU PLAS

## (undated) DEVICE — POSITIONER HD W8XH4XL8.5IN RASPBERRY FOAM SLT

## (undated) DEVICE — TIP COVER ACCESSORY

## (undated) DEVICE — REDUCER: Brand: ENDOWRIST

## (undated) DEVICE — TRAY PREP DRY W/ PREM GLV 2 APPL 6 SPNG 2 UNDPD 1 OVERWRAP

## (undated) DEVICE — LAPAROSCOPIC ACCESS SYSTEM: Brand: ALEXIS LAPAROSCOPIC SYSTEM WITH KII FIOS FIRST ENTRY

## (undated) DEVICE — TOTAL TRAY, DB, 100% SILI FOLEY, 16FR 10: Brand: MEDLINE

## (undated) DEVICE — MERCY FAIRFIELD TURNOVER KIT: Brand: MEDLINE INDUSTRIES, INC.

## (undated) DEVICE — ARM DRAPE

## (undated) DEVICE — STAPLER EXT 65MM S STL AUTO DISP PURSTRING

## (undated) DEVICE — TOWEL,OR,DSP,ST,BLUE,STD,4/PK,20PK/CS: Brand: MEDLINE

## (undated) DEVICE — SUTURE ABSORBABLE MONOFILAMENT 0 CTX 60 IN VIO PDS + PDP990G

## (undated) DEVICE — SUTURE MCRYL + SZ 4-0 L27IN ABSRB UD L19MM PS-2 3/8 CIR MCP426H

## (undated) DEVICE — MAJOR SET UP PK

## (undated) DEVICE — STAPLER 60 RELOAD GREEN: Brand: SUREFORM

## (undated) DEVICE — INVIEW CLEAR LEGGINGS: Brand: CONVERTORS

## (undated) DEVICE — SOLUTION IV IRRIG POUR BRL 0.9% SODIUM CHL 2F7124

## (undated) DEVICE — BLADE CLIPPER GEN PURP NS

## (undated) DEVICE — STAPLER SHEATH: Brand: ENDOWRIST

## (undated) DEVICE — APPLICATOR MEDICATED 26 CC SOLUTION HI LT ORNG CHLORAPREP

## (undated) DEVICE — DRESSING,GAUZE,XEROFORM,CURAD,1"X8",ST: Brand: CURAD

## (undated) DEVICE — PMI DISPOSABLE PUNCTURE CLOSURE DEVICE / SUTURE GRASPER: Brand: PMI

## (undated) DEVICE — SUTURE VCRL SZ 3-0 L18IN ABSRB UD L26MM SH 1/2 CIR J864D

## (undated) DEVICE — NEEDLE HYPO BVL 21 GAX1.5 IN REG PLAS HUB SS STRL ECLIPSE

## (undated) DEVICE — BLADELESS OBTURATOR: Brand: WECK VISTA

## (undated) DEVICE — LIGHT HANDLE: Brand: DEVON

## (undated) DEVICE — ELECTRODE PT RET AD L9FT HI MOIST COND ADH HYDRGEL CORDED

## (undated) DEVICE — VESSEL SEALER EXTEND: Brand: ENDOWRIST

## (undated) DEVICE — SUTURE VCRL SZ 2-0 L18IN ABSRB VLT L26MM SH 1/2 CIR J775D

## (undated) DEVICE — SUTURE ETHLN SZ 3-0 L18IN NONABSORBABLE BLK L24MM PS-1 3/8 1663G

## (undated) DEVICE — SYRINGE MED 10ML LUERLOCK TIP W/O SFTY DISP

## (undated) DEVICE — INTENDED FOR TISSUE SEPARATION, AND OTHER PROCEDURES THAT REQUIRE A SHARP SURGICAL BLADE TO PUNCTURE OR CUT.: Brand: BARD-PARKER ® STAINLESS STEEL BLADES

## (undated) DEVICE — STAPLER 60: Brand: SUREFORM

## (undated) DEVICE — NEEDLE INSUF L120MM DIA2MM DISP FOR PNEUMOPERI ENDOPATH

## (undated) DEVICE — DRAPE ADOLESCENT  LAPAROTOMY

## (undated) DEVICE — 3 ML SYRINGE LUER-LOCK TIP: Brand: MONOJECT

## (undated) DEVICE — NEEDLE HYPO 22GA L1.5IN BLK POLYPR HUB S STL REG BVL STR

## (undated) DEVICE — 30977 SEE SHARP - ENHANCED INTRAOPERATIVE LAPAROSCOPE CLEANING & DEFOGGING: Brand: 30977 SEE SHARP - ENHANCED INTRAOPERATIVE LAPAROSCOPE CLEANING & DEFOGGING

## (undated) DEVICE — SHEET,DRAPE,53X77,STERILE: Brand: MEDLINE

## (undated) DEVICE — SUTURE PERMA-HAND SZ 2-0 L30IN NONABSORBABLE BLK L26MM SH K833H

## (undated) DEVICE — AIRSEAL 5 MM ACCESS PORT AND LOW PROFILE OBTURATOR WITH BLADELESS OPTICAL TIP, 100 MM LENGTH: Brand: AIRSEAL

## (undated) DEVICE — SPONGE LAP W18XL18IN WHT COT 4 PLY FLD STRUNG RADPQ DISP ST

## (undated) DEVICE — ARM CRADLE: Brand: DEVON

## (undated) DEVICE — CANNULA SEAL